# Patient Record
Sex: MALE | Race: WHITE | NOT HISPANIC OR LATINO | Employment: UNEMPLOYED | URBAN - METROPOLITAN AREA
[De-identification: names, ages, dates, MRNs, and addresses within clinical notes are randomized per-mention and may not be internally consistent; named-entity substitution may affect disease eponyms.]

---

## 2020-03-16 ENCOUNTER — OFFICE VISIT (OUTPATIENT)
Dept: UROLOGY | Facility: CLINIC | Age: 80
End: 2020-03-16
Payer: MEDICARE

## 2020-03-16 VITALS
BODY MASS INDEX: 35.36 KG/M2 | SYSTOLIC BLOOD PRESSURE: 111 MMHG | HEART RATE: 81 BPM | DIASTOLIC BLOOD PRESSURE: 62 MMHG | WEIGHT: 220 LBS | HEIGHT: 66 IN | RESPIRATION RATE: 20 BRPM

## 2020-03-16 DIAGNOSIS — N40.1 BPH WITH URINARY OBSTRUCTION: Primary | ICD-10-CM

## 2020-03-16 DIAGNOSIS — N13.8 BPH WITH URINARY OBSTRUCTION: Primary | ICD-10-CM

## 2020-03-16 LAB — POC RESIDUAL URINE VOLUME: 6 ML (ref 0–100)

## 2020-03-16 PROCEDURE — 51798 US URINE CAPACITY MEASURE: CPT | Mod: S$GLB,,, | Performed by: NURSE PRACTITIONER

## 2020-03-16 PROCEDURE — 99214 OFFICE O/P EST MOD 30 MIN: CPT | Mod: 25,S$GLB,, | Performed by: NURSE PRACTITIONER

## 2020-03-16 PROCEDURE — 51798 POCT BLADDER SCAN: ICD-10-PCS | Mod: S$GLB,,, | Performed by: NURSE PRACTITIONER

## 2020-03-16 PROCEDURE — 99214 PR OFFICE/OUTPT VISIT, EST, LEVL IV, 30-39 MIN: ICD-10-PCS | Mod: 25,S$GLB,, | Performed by: NURSE PRACTITIONER

## 2020-03-16 RX ORDER — MONTELUKAST SODIUM 10 MG/1
10 TABLET ORAL DAILY
Status: ON HOLD | COMMUNITY
Start: 2020-02-10 | End: 2023-03-24 | Stop reason: HOSPADM

## 2020-03-16 RX ORDER — FUROSEMIDE 80 MG/1
TABLET ORAL
COMMUNITY
Start: 2020-02-24

## 2020-03-16 RX ORDER — ENALAPRIL MALEATE 20 MG/1
20 TABLET ORAL DAILY
COMMUNITY
Start: 2020-01-07

## 2020-03-16 RX ORDER — ALPRAZOLAM 0.25 MG/1
TABLET ORAL
COMMUNITY
Start: 2020-02-24

## 2020-03-16 RX ORDER — TERAZOSIN 2 MG/1
2 CAPSULE ORAL NIGHTLY
Status: ON HOLD | COMMUNITY
Start: 2019-12-17 | End: 2023-03-24 | Stop reason: HOSPADM

## 2020-03-16 RX ORDER — WARFARIN 4 MG/1
4 TABLET ORAL
Status: ON HOLD | COMMUNITY
Start: 2020-01-07 | End: 2023-03-24 | Stop reason: HOSPADM

## 2020-03-16 RX ORDER — AMLODIPINE BESYLATE 10 MG/1
10 TABLET ORAL DAILY
Status: ON HOLD | COMMUNITY
Start: 2020-01-07 | End: 2023-03-24 | Stop reason: HOSPADM

## 2020-03-16 NOTE — PROGRESS NOTES
Subjective:       Patient ID: Dm Marinelli is a 79 y.o. male.    Chief Complaint: Yrly (PSA (11-18-19): 2.81)      HPI: 79-year-old male, patient of Dr. Simon, presents for yearly re-evaluation.  Patient has a history BPH with obstruction.  He is on Hytrin.  Patient states he is doing well.  Patient denies pain or burning urination.  States he has a good stream.  Denies difficulty voiding.  Denies blood in his urine.  May have occasional nocturia.    Patient's PSA is managed by his primary care doctor.  Patient had a PSA 11/18/2019, which was 2.81.  PCP also manage his Hytrin.    No other urinary complaints.  All other health problems are stable at this time.       Past Medical History:   Past Medical History:   Diagnosis Date    Anxiety     BPH with urinary obstruction     HTN (hypertension)        Past Surgical Historical:   Past Surgical History:   Procedure Laterality Date    PROSTATE BIOPSY          Medications:   Medication List with Changes/Refills   Current Medications    ALPRAZOLAM (XANAX) 0.25 MG TABLET        AMLODIPINE (NORVASC) 10 MG TABLET    Take 10 mg by mouth once daily.    ENALAPRIL (VASOTEC) 20 MG TABLET    Take 20 mg by mouth once daily.    FUROSEMIDE (LASIX) 80 MG TABLET        MONTELUKAST (SINGULAIR) 10 MG TABLET    Take 10 mg by mouth once daily.    TERAZOSIN (HYTRIN) 2 MG CAPSULE    Take 2 mg by mouth every evening.    WARFARIN (COUMADIN) 4 MG TABLET    Take 4 mg by mouth.        Past Social History:   Social History     Socioeconomic History    Marital status:      Spouse name: Not on file    Number of children: Not on file    Years of education: Not on file    Highest education level: Not on file   Occupational History    Not on file   Social Needs    Financial resource strain: Not on file    Food insecurity:     Worry: Not on file     Inability: Not on file    Transportation needs:     Medical: Not on file     Non-medical: Not on file   Tobacco Use    Smoking  status: Never Smoker   Substance and Sexual Activity    Alcohol use: Yes    Drug use: Not on file    Sexual activity: Not on file   Lifestyle    Physical activity:     Days per week: Not on file     Minutes per session: Not on file    Stress: Not on file   Relationships    Social connections:     Talks on phone: Not on file     Gets together: Not on file     Attends [employer] service: Not on file     Active member of club or organization: Not on file     Attends meetings of clubs or organizations: Not on file     Relationship status: Not on file   Other Topics Concern    Not on file   Social History Narrative    Not on file       Allergies: Review of patient's allergies indicates:  No Known Allergies     Family History: History reviewed. No pertinent family history.     Review of Systems:  Review of Systems   Constitutional: Negative for activity change and appetite change.   HENT: Negative for congestion and dental problem.    Eyes: Negative for visual disturbance.   Respiratory: Negative for chest tightness and shortness of breath.    Cardiovascular: Negative for chest pain.   Gastrointestinal: Negative for abdominal distention and abdominal pain.   Genitourinary: Negative for decreased urine volume, difficulty urinating, discharge, dysuria, enuresis, flank pain, frequency, genital sores, hematuria, penile pain, penile swelling, scrotal swelling, testicular pain and urgency.   Musculoskeletal: Negative for back pain and neck pain.   Skin: Negative for color change.   Neurological: Negative for dizziness.   Hematological: Negative for adenopathy.   Psychiatric/Behavioral: Negative for agitation, behavioral problems and confusion.       Physical Exam:  Physical Exam   Nursing note and vitals reviewed.  Constitutional: He is oriented to person, place, and time. He appears well-developed and well-nourished.   HENT:   Head: Normocephalic.   Eyes: Pupils are equal, round, and reactive to light.   Neck: Normal  range of motion. Neck supple.   Cardiovascular: Normal rate, regular rhythm and normal heart sounds.    Pulmonary/Chest: Effort normal and breath sounds normal.   Abdominal: Soft. Bowel sounds are normal.   Genitourinary: Rectum normal, prostate normal and penis normal.   Genitourinary Comments: Prostate exam of is benign, no nodules and nontender.   Musculoskeletal: Normal range of motion.   Neurological: He is alert and oriented to person, place, and time.   Skin: Skin is warm and dry.     Psychiatric: He has a normal mood and affect. His behavior is normal.     Bladder scan:  6 cc    Assessment/Plan:   BPH with obstruction:  Patient will continue Hytrin as directed.  Patient will continue to have his PSA checked by his primary care doctor.    Will plan follow-up in 1 year, sooner if needed.  Problem List Items Addressed This Visit     None      Visit Diagnoses     BPH with urinary obstruction    -  Primary    Relevant Orders    POCT Bladder Scan (Completed)

## 2023-03-23 ENCOUNTER — HOSPITAL ENCOUNTER (INPATIENT)
Facility: HOSPITAL | Age: 83
LOS: 3 days | Discharge: HOSPICE | DRG: 834 | End: 2023-03-26
Attending: INTERNAL MEDICINE | Admitting: INTERNAL MEDICINE
Payer: MEDICARE

## 2023-03-23 DIAGNOSIS — E88.3 TUMOR LYSIS SYNDROME: Primary | ICD-10-CM

## 2023-03-23 DIAGNOSIS — R07.9 CHEST PAIN: ICD-10-CM

## 2023-03-23 DIAGNOSIS — C95.00 ACUTE LEUKEMIA: ICD-10-CM

## 2023-03-23 DIAGNOSIS — D72.829 LEUCOCYTOSIS: ICD-10-CM

## 2023-03-23 DIAGNOSIS — R09.02 HYPOXIA: ICD-10-CM

## 2023-03-23 PROBLEM — R06.02 SHORTNESS OF BREATH: Status: ACTIVE | Noted: 2023-03-23

## 2023-03-23 PROBLEM — N17.9 AKI (ACUTE KIDNEY INJURY): Status: ACTIVE | Noted: 2023-03-23

## 2023-03-23 PROBLEM — F41.9 ANXIETY: Status: ACTIVE | Noted: 2023-03-23

## 2023-03-23 PROBLEM — I10 HTN (HYPERTENSION): Status: ACTIVE | Noted: 2023-03-23

## 2023-03-23 PROBLEM — I48.0 PAROXYSMAL ATRIAL FIBRILLATION: Status: ACTIVE | Noted: 2023-03-23

## 2023-03-23 PROBLEM — R53.81 PHYSICAL DEBILITY: Status: ACTIVE | Noted: 2023-03-23

## 2023-03-23 PROBLEM — D64.9 ANEMIA: Status: ACTIVE | Noted: 2023-03-23

## 2023-03-23 LAB
ABO + RH BLD: NORMAL
ALBUMIN SERPL BCP-MCNC: 3.2 G/DL (ref 3.5–5.2)
ALP SERPL-CCNC: 59 U/L (ref 55–135)
ALT SERPL W/O P-5'-P-CCNC: 15 U/L (ref 10–44)
ANION GAP SERPL CALC-SCNC: 10 MMOL/L (ref 8–16)
ANISOCYTOSIS BLD QL SMEAR: SLIGHT
APTT BLDCRRT: 44.1 SEC (ref 21–32)
AST SERPL-CCNC: 21 U/L (ref 10–40)
BASO STIPL BLD QL SMEAR: ABNORMAL
BASOPHILS NFR BLD: 0 % (ref 0–1.9)
BILIRUB SERPL-MCNC: 0.9 MG/DL (ref 0.1–1)
BLD GP AB SCN CELLS X3 SERPL QL: NORMAL
BNP SERPL-MCNC: 1102 PG/ML (ref 0–99)
BUN SERPL-MCNC: 155 MG/DL (ref 8–23)
CALCIUM SERPL-MCNC: 7.9 MG/DL (ref 8.7–10.5)
CHLORIDE SERPL-SCNC: 107 MMOL/L (ref 95–110)
CO2 SERPL-SCNC: 18 MMOL/L (ref 23–29)
CREAT SERPL-MCNC: 4 MG/DL (ref 0.5–1.4)
DIFFERENTIAL METHOD: ABNORMAL
DOHLE BOD BLD QL SMEAR: PRESENT
EOSINOPHIL NFR BLD: 0 % (ref 0–8)
ERYTHROCYTE [DISTWIDTH] IN BLOOD BY AUTOMATED COUNT: 21.1 % (ref 11.5–14.5)
EST. GFR  (NO RACE VARIABLE): 14.1 ML/MIN/1.73 M^2
FERRITIN SERPL-MCNC: 365 NG/ML (ref 20–300)
FIBRINOGEN PPP-MCNC: 389 MG/DL (ref 182–400)
FOLATE SERPL-MCNC: 7.3 NG/ML (ref 4–24)
GIANT PLATELETS BLD QL SMEAR: PRESENT
GLUCOSE SERPL-MCNC: 121 MG/DL (ref 70–110)
HBV CORE AB SERPL QL IA: NORMAL
HBV SURFACE AB SER-ACNC: <3 MIU/ML
HBV SURFACE AB SER-ACNC: NORMAL M[IU]/ML
HBV SURFACE AG SERPL QL IA: NORMAL
HCT VFR BLD AUTO: 22.9 % (ref 40–54)
HCV AB SERPL QL IA: NORMAL
HGB BLD-MCNC: 7 G/DL (ref 14–18)
HIV 1+2 AB+HIV1 P24 AG SERPL QL IA: NORMAL
HYPOCHROMIA BLD QL SMEAR: ABNORMAL
IMM GRANULOCYTES # BLD AUTO: ABNORMAL K/UL (ref 0–0.04)
IMM GRANULOCYTES NFR BLD AUTO: ABNORMAL % (ref 0–0.5)
INR PPP: 2.8 (ref 0.8–1.2)
LDH SERPL L TO P-CCNC: 477 U/L (ref 110–260)
LYMPHOCYTES NFR BLD: 8 % (ref 18–48)
MAGNESIUM SERPL-MCNC: 2.9 MG/DL (ref 1.6–2.6)
MCH RBC QN AUTO: 30.3 PG (ref 27–31)
MCHC RBC AUTO-ENTMCNC: 30.6 G/DL (ref 32–36)
MCV RBC AUTO: 99 FL (ref 82–98)
METAMYELOCYTES NFR BLD MANUAL: 5 %
MONOCYTES NFR BLD: 0 % (ref 4–15)
MYELOCYTES NFR BLD MANUAL: 4 %
NEUTROPHILS NFR BLD: 73 % (ref 38–73)
NEUTS BAND NFR BLD MANUAL: 4 %
NRBC BLD-RTO: 7 /100 WBC
OVALOCYTES BLD QL SMEAR: ABNORMAL
PHOSPHATE SERPL-MCNC: 6.7 MG/DL (ref 2.7–4.5)
PLATELET # BLD AUTO: 403 K/UL (ref 150–450)
PLATELET BLD QL SMEAR: ABNORMAL
PMV BLD AUTO: 11.4 FL (ref 9.2–12.9)
POIKILOCYTOSIS BLD QL SMEAR: SLIGHT
POLYCHROMASIA BLD QL SMEAR: ABNORMAL
POTASSIUM SERPL-SCNC: 5.7 MMOL/L (ref 3.5–5.1)
PROMYELOCYTES NFR BLD MANUAL: 2 %
PROT SERPL-MCNC: 6.3 G/DL (ref 6–8.4)
PROTHROMBIN TIME: 27.5 SEC (ref 9–12.5)
RBC # BLD AUTO: 2.31 M/UL (ref 4.6–6.2)
SODIUM SERPL-SCNC: 135 MMOL/L (ref 136–145)
SPECIMEN OUTDATE: NORMAL
URATE SERPL-MCNC: 21.8 MG/DL (ref 3.4–7)
VIT B12 SERPL-MCNC: >2000 PG/ML (ref 210–950)
WBC # BLD AUTO: 41.53 K/UL (ref 3.9–12.7)
WBC OTHER NFR BLD MANUAL: 4 %

## 2023-03-23 PROCEDURE — 51702 INSERT TEMP BLADDER CATH: CPT

## 2023-03-23 PROCEDURE — 85027 COMPLETE CBC AUTOMATED: CPT | Performed by: NURSE PRACTITIONER

## 2023-03-23 PROCEDURE — 20600001 HC STEP DOWN PRIVATE ROOM

## 2023-03-23 PROCEDURE — 88189 FLOWCYTOMETRY/READ 16 & >: CPT | Mod: ,,, | Performed by: PATHOLOGY

## 2023-03-23 PROCEDURE — 87340 HEPATITIS B SURFACE AG IA: CPT | Performed by: NURSE PRACTITIONER

## 2023-03-23 PROCEDURE — 85007 BL SMEAR W/DIFF WBC COUNT: CPT | Performed by: NURSE PRACTITIONER

## 2023-03-23 PROCEDURE — 85384 FIBRINOGEN ACTIVITY: CPT | Performed by: NURSE PRACTITIONER

## 2023-03-23 PROCEDURE — 86704 HEP B CORE ANTIBODY TOTAL: CPT | Performed by: NURSE PRACTITIONER

## 2023-03-23 PROCEDURE — 93005 ELECTROCARDIOGRAM TRACING: CPT

## 2023-03-23 PROCEDURE — 85060 BLOOD SMEAR INTERPRETATION: CPT | Mod: ,,, | Performed by: PATHOLOGY

## 2023-03-23 PROCEDURE — 27000221 HC OXYGEN, UP TO 24 HOURS

## 2023-03-23 PROCEDURE — 85610 PROTHROMBIN TIME: CPT | Performed by: NURSE PRACTITIONER

## 2023-03-23 PROCEDURE — 88184 FLOWCYTOMETRY/ TC 1 MARKER: CPT | Performed by: PATHOLOGY

## 2023-03-23 PROCEDURE — 86920 COMPATIBILITY TEST SPIN: CPT | Performed by: NURSE PRACTITIONER

## 2023-03-23 PROCEDURE — 36415 COLL VENOUS BLD VENIPUNCTURE: CPT | Performed by: INTERNAL MEDICINE

## 2023-03-23 PROCEDURE — 85060 PATHOLOGIST REVIEW: ICD-10-PCS | Mod: ,,, | Performed by: PATHOLOGY

## 2023-03-23 PROCEDURE — 99223 1ST HOSP IP/OBS HIGH 75: CPT | Mod: AI,,, | Performed by: INTERNAL MEDICINE

## 2023-03-23 PROCEDURE — 82728 ASSAY OF FERRITIN: CPT | Performed by: NURSE PRACTITIONER

## 2023-03-23 PROCEDURE — 83615 LACTATE (LD) (LDH) ENZYME: CPT | Performed by: NURSE PRACTITIONER

## 2023-03-23 PROCEDURE — 87389 HIV-1 AG W/HIV-1&-2 AB AG IA: CPT | Performed by: NURSE PRACTITIONER

## 2023-03-23 PROCEDURE — 36415 COLL VENOUS BLD VENIPUNCTURE: CPT | Performed by: NURSE PRACTITIONER

## 2023-03-23 PROCEDURE — 82746 ASSAY OF FOLIC ACID SERUM: CPT | Performed by: NURSE PRACTITIONER

## 2023-03-23 PROCEDURE — 86706 HEP B SURFACE ANTIBODY: CPT | Mod: 91 | Performed by: NURSE PRACTITIONER

## 2023-03-23 PROCEDURE — 85730 THROMBOPLASTIN TIME PARTIAL: CPT | Performed by: NURSE PRACTITIONER

## 2023-03-23 PROCEDURE — 25000003 PHARM REV CODE 250: Performed by: STUDENT IN AN ORGANIZED HEALTH CARE EDUCATION/TRAINING PROGRAM

## 2023-03-23 PROCEDURE — 80053 COMPREHEN METABOLIC PANEL: CPT | Performed by: NURSE PRACTITIONER

## 2023-03-23 PROCEDURE — 94761 N-INVAS EAR/PLS OXIMETRY MLT: CPT

## 2023-03-23 PROCEDURE — 93010 EKG 12-LEAD: ICD-10-PCS | Mod: ,,, | Performed by: INTERNAL MEDICINE

## 2023-03-23 PROCEDURE — 83735 ASSAY OF MAGNESIUM: CPT | Performed by: NURSE PRACTITIONER

## 2023-03-23 PROCEDURE — 84100 ASSAY OF PHOSPHORUS: CPT | Performed by: NURSE PRACTITIONER

## 2023-03-23 PROCEDURE — 86900 BLOOD TYPING SEROLOGIC ABO: CPT | Performed by: NURSE PRACTITIONER

## 2023-03-23 PROCEDURE — 86803 HEPATITIS C AB TEST: CPT | Performed by: NURSE PRACTITIONER

## 2023-03-23 PROCEDURE — 83880 ASSAY OF NATRIURETIC PEPTIDE: CPT | Performed by: NURSE PRACTITIONER

## 2023-03-23 PROCEDURE — 99223 PR INITIAL HOSPITAL CARE,LEVL III: ICD-10-PCS | Mod: AI,,, | Performed by: INTERNAL MEDICINE

## 2023-03-23 PROCEDURE — 84550 ASSAY OF BLOOD/URIC ACID: CPT | Performed by: NURSE PRACTITIONER

## 2023-03-23 PROCEDURE — 88185 FLOWCYTOMETRY/TC ADD-ON: CPT | Performed by: PATHOLOGY

## 2023-03-23 PROCEDURE — 63600175 PHARM REV CODE 636 W HCPCS: Performed by: STUDENT IN AN ORGANIZED HEALTH CARE EDUCATION/TRAINING PROGRAM

## 2023-03-23 PROCEDURE — 88189 PR  FLOWCYTOMETRY/READ, 16 & > MARKERS: ICD-10-PCS | Mod: ,,, | Performed by: PATHOLOGY

## 2023-03-23 PROCEDURE — 93010 ELECTROCARDIOGRAM REPORT: CPT | Mod: ,,, | Performed by: INTERNAL MEDICINE

## 2023-03-23 PROCEDURE — 82607 VITAMIN B-12: CPT | Performed by: NURSE PRACTITIONER

## 2023-03-23 RX ORDER — ALLOPURINOL 100 MG/1
300 TABLET ORAL DAILY
Status: DISCONTINUED | OUTPATIENT
Start: 2023-03-23 | End: 2023-03-24

## 2023-03-23 RX ORDER — SODIUM CHLORIDE 9 MG/ML
INJECTION, SOLUTION INTRAVENOUS CONTINUOUS
Status: DISCONTINUED | OUTPATIENT
Start: 2023-03-23 | End: 2023-03-23

## 2023-03-23 RX ORDER — SEVELAMER CARBONATE 800 MG/1
1600 TABLET, FILM COATED ORAL
Status: DISCONTINUED | OUTPATIENT
Start: 2023-03-24 | End: 2023-03-26

## 2023-03-23 RX ORDER — FUROSEMIDE 10 MG/ML
80 INJECTION INTRAMUSCULAR; INTRAVENOUS ONCE
Status: COMPLETED | OUTPATIENT
Start: 2023-03-23 | End: 2023-03-23

## 2023-03-23 RX ORDER — ALPRAZOLAM 0.25 MG/1
0.25 TABLET ORAL 2 TIMES DAILY PRN
Status: DISCONTINUED | OUTPATIENT
Start: 2023-03-23 | End: 2023-03-26 | Stop reason: HOSPADM

## 2023-03-23 RX ORDER — FUROSEMIDE 40 MG/1
80 TABLET ORAL DAILY
Status: DISCONTINUED | OUTPATIENT
Start: 2023-03-24 | End: 2023-03-26 | Stop reason: HOSPADM

## 2023-03-23 RX ORDER — SODIUM CHLORIDE 0.9 % (FLUSH) 0.9 %
10 SYRINGE (ML) INJECTION EVERY 12 HOURS PRN
Status: DISCONTINUED | OUTPATIENT
Start: 2023-03-23 | End: 2023-03-26 | Stop reason: HOSPADM

## 2023-03-23 RX ORDER — SODIUM CHLORIDE 9 MG/ML
INJECTION, SOLUTION INTRAVENOUS CONTINUOUS
Status: DISCONTINUED | OUTPATIENT
Start: 2023-03-23 | End: 2023-03-26 | Stop reason: HOSPADM

## 2023-03-23 RX ORDER — GLUCAGON 1 MG
1 KIT INJECTION
Status: DISCONTINUED | OUTPATIENT
Start: 2023-03-23 | End: 2023-03-26 | Stop reason: HOSPADM

## 2023-03-23 RX ORDER — NALOXONE HCL 0.4 MG/ML
0.02 VIAL (ML) INJECTION
Status: DISCONTINUED | OUTPATIENT
Start: 2023-03-23 | End: 2023-03-26 | Stop reason: HOSPADM

## 2023-03-23 RX ORDER — LISINOPRIL 20 MG/1
40 TABLET ORAL DAILY
Status: DISCONTINUED | OUTPATIENT
Start: 2023-03-24 | End: 2023-03-23

## 2023-03-23 RX ADMIN — FUROSEMIDE 80 MG: 10 INJECTION, SOLUTION INTRAMUSCULAR; INTRAVENOUS at 10:03

## 2023-03-23 RX ADMIN — SODIUM CHLORIDE: 9 INJECTION, SOLUTION INTRAVENOUS at 10:03

## 2023-03-23 RX ADMIN — ALLOPURINOL 300 MG: 100 TABLET ORAL at 10:03

## 2023-03-23 NOTE — PLAN OF CARE
A&Ox4. VVS on 2L NC. Adequate UOP per urinal. Tolerating Renal diet well. ML abd hernia, large. Left upper arm skin tear, bilateral buttocks reddened with skin tears, lower coccyx scabs, gluteal cleft reddened, all with foams placed this shift, present upon admit. Ambulated to hallways with standby assist and walker. Pt remains free of falls/injuries this shift. POC reviewed with patient. Pt currently resting comfortably, bed in low position, call light in reach. WCTM.    Problem: Adult Inpatient Plan of Care  Goal: Plan of Care Review  Outcome: Ongoing, Progressing     Problem: Fall Injury Risk  Goal: Absence of Fall and Fall-Related Injury  Outcome: Ongoing, Progressing

## 2023-03-23 NOTE — H&P
"[facility] - [facility]  Hematology  Bone Marrow Transplant  H&P    Subjective:     Principal Problem: Leukocytosis    HPI: 82 year old male transferred from [facility] ED in [place] for suspected acute leukemia. Patient presented to ED there yesterday with complaints of sob and weakness. Patient reports prior he was ambulating with walker at home without difficulty and is now "too weak to stand". He has a history of afib, on Coumadin and HTN. Denies history of heart failure. History of CoVID in 2022 requiring prolonged hospital stay on ventilator and has PEG placed (now removed) and was discharged to LTAC.    At OSH ED noted to have WBC 48k and hgb 6.7. He was transfused 1 unit of PRBC and given 1.5L NS. Also given IV Rocephin x 1.  Lactic 1.7. Peripheral smear showed no increased blasts, leukoerythroblastosis with circulating nucleated RBC's noted. Flow cytometry recommended. BC done. RIP negative. CXR showed mild interstitial edema. D-dimer and INR elevated (on Coumdain) fibrinogen was wnl. Noted to have LOKI with creatinine of 4.7 and K 5.8. .     Patient denies any recent fevers, no sore throat or difficulty swallowing. Denies nausea, vomiting, constipation or diarrhea. Denies any abnormal bleeding. Has no complaints of pain.      Patient information was obtained from patient and past medical records.     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   3/23/2023    furosemide (LASIX) 80 MG tablet    3/22/2023    warfarin (COUMADIN) 4 MG tablet Take 4 mg by mouth.   3/23/2023    amLODIPine (NORVASC) 10 MG tablet Take 10 mg by mouth once daily.   Unknown    montelukast (SINGULAIR) 10 mg tablet Take 10 mg by mouth once daily.   Unknown    terazosin " (HYTRIN) 2 MG capsule Take 2 mg by mouth every evening.   Unknown       Patient has no known allergies.     Past Medical History:   Diagnosis Date    Anxiety     BPH with urinary obstruction     HTN (hypertension)      Past Surgical History:   Procedure Laterality Date    PROSTATE BIOPSY       Family History    None       Tobacco Use    Smoking status: Never    Smokeless tobacco: Not on file   Substance and Sexual Activity    Alcohol use: Yes    Drug use: Not on file    Sexual activity: Not on file       Review of Systems   Constitutional:  Positive for fatigue. Negative for activity change, appetite change, chills, diaphoresis, fever and unexpected weight change.   HENT:  Negative for congestion, dental problem, mouth sores, nosebleeds, postnasal drip, rhinorrhea, sinus pressure, sore throat and trouble swallowing.    Eyes:  Negative for photophobia and visual disturbance.   Respiratory:  Positive for cough and shortness of breath.    Cardiovascular:  Negative for chest pain, palpitations and leg swelling.   Gastrointestinal:  Negative for abdominal distention, abdominal pain, blood in stool, constipation, diarrhea, nausea and vomiting.   Genitourinary:  Negative for dysuria, frequency, hematuria and urgency.   Musculoskeletal:  Negative for arthralgias, back pain and myalgias.   Skin:  Negative for pallor and rash.   Allergic/Immunologic: Negative for immunocompromised state.   Neurological:  Positive for weakness. Negative for dizziness, syncope, numbness and headaches.   Hematological:  Negative for adenopathy. Does not bruise/bleed easily.   Psychiatric/Behavioral:  Negative for confusion. The patient is not nervous/anxious.    Objective:     Vital Signs (Most Recent):  Temp: 97.8 °F (36.6 °C) (03/23/23 1100)  Pulse: 77 (03/23/23 1100)  Resp: 20 (03/23/23 1100)  BP: (Abnormal) 103/55 (03/23/23 1100)  SpO2: 96 % (03/23/23 1100)   Vital Signs (24h Range):  Temp:  [97.5 °F (36.4 °C)-97.8 °F (36.6 °C)]  97.8 °F (36.6 °C)  Pulse:  [64-77] 77  Resp:  [18-20] 20  SpO2:  [89 %-96 %] 96 %  BP: ()/(30-57) 103/55     Weight: 86.2 kg (190 lb)  Body mass index is 26.5 kg/m².  Body surface area is 2.08 meters squared.    ECOG SCORE           [unfilled]    Lines/Drains/Airways       None                   Physical Exam  Vitals reviewed.   Constitutional:       General: He is not in acute distress.     Appearance: He is well-developed. He is obese.   HENT:      Head: Normocephalic.      Mouth/Throat:      Pharynx: No oropharyngeal exudate.   Eyes:      General: No scleral icterus.     Conjunctiva/sclera: Conjunctivae normal.      Pupils: Pupils are equal, round, and reactive to light.   Neck:      Thyroid: No thyromegaly.   Cardiovascular:      Rate and Rhythm: Normal rate. Rhythm irregular.      Pulses: Normal pulses.      Heart sounds: Normal heart sounds.   Pulmonary:      Effort: Pulmonary effort is normal. No respiratory distress.      Breath sounds: Examination of the right-middle field reveals decreased breath sounds. Examination of the left-middle field reveals decreased breath sounds. Examination of the right-lower field reveals decreased breath sounds. Examination of the left-lower field reveals decreased breath sounds. Decreased breath sounds present.      Comments: O2 at 2L NC  Abdominal:      General: Bowel sounds are normal. There is no distension.      Palpations: Abdomen is soft.      Tenderness: There is no abdominal tenderness.   Musculoskeletal:         General: No tenderness. Normal range of motion.      Cervical back: Normal range of motion and neck supple. Normal range of motion.      Right lower leg: Edema (+1) present.      Left lower leg: Edema (+1) present.   Lymphadenopathy:      Head:      Right side of head: No submandibular, preauricular, posterior auricular or occipital adenopathy.      Left side of head: No submandibular, preauricular, posterior auricular or occipital adenopathy.       Cervical: No cervical adenopathy.   Skin:     General: Skin is warm and dry.      Coloration: Skin is not pale.      Findings: No petechiae or rash.   Neurological:      General: No focal deficit present.      Mental Status: He is alert and oriented to person, place, and time.      Cranial Nerves: No cranial nerve deficit.      Coordination: Coordination normal.   Psychiatric:         Mood and Affect: Mood normal.         Behavior: Behavior normal.         Thought Content: Thought content normal.       Significant Labs:   None    Diagnostic Results:  I have reviewed all pertinent imaging results/findings within the past 24 hours.    Assessment/Plan:     Psychiatric  Anxiety  - continue home PRN Xanax    Pulmonary  Shortness of breath  - infections vs heart failure vs leukostasis  - CXR shows increased perihilar and prominent interstitial opacities which could represent a component of pulmonary vascular congestion.  Left lung base opacity cannot be entirely excluded. PA and lateral recommended and ordered  - EKG pending, hx of afib  -  at OSH, will repeat  - 2 D echo pending  - continue home lasix      Cardiac/Vascular  HTN (hypertension)  - enalapril, lasix, norvasc and hytrin on home medications list  - patient reports he no longer takes norvasc or hytrin  - enalapril and lasix continued     Paroxysmal atrial fibrillation  - history of   - on coumadin (will hold for BM BX)  - rate controlled afib at outside EKG  - repeat EKG  - cardiac monitoring    Renal/  LOKI (acute kidney injury)  - creatinine 4.7 at OSH, prior renal function wnl per records  - repeat CMP now and daily  - urine studies ordered  - renal ultrasound ordered  - uric acid, LDH and phos ordered to evaluate for TLS  - avoid nephrotoxic medication    Oncology  * Leukocytosis  - 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 Flow cytometry recommended  -  afebrile  - BC done at previous hospital, will obtain UA and chest X-ray to r/o infection  - received Rocephin x 1 at OSH ED  - peripheral flow ordered  - will likely need bone marrow evaluation  - repeat CBC with coags and TLS labs  - Hep B, C and HIV ordered  - 2D echo ordered    Anemia  - hgb 6.7 at OSH  - transfused 1 unit PRBC prior to transfer  - repeat CBC now and daily  - obtain B12, folate, ferritin  - denies bleeding  - will likely need bone marrow evaluation     Other  Physical debility  - new weakness, unable to stand per patient  - PT/OT eval        VTE Risk Mitigation (From admission, onward)         Ordered     Reason for No Pharmacological VTE Prophylaxis  Once        Question:  Reasons:  Answer:  Thrombocytopenia    03/23/23 1154     IP VTE HIGH RISK PATIENT  Once         03/23/23 1154     Place sequential compression device  Until discontinued         03/23/23 1154                Disposition: admit to inpatient     Meredith Amin NP  Bone Marrow Transplant  Hematology  [facility]

## 2023-03-23 NOTE — HPI
"82 year old male transferred from [facility] ED in [place] for suspected acute leukemia. Patient presented to ED there yesterday with complaints of sob and weakness. Patient reports prior he was ambulating with walker at home without difficulty and is now "too weak to stand". He has a history of afib, on Coumadin and HTN. Denies history of heart failure. History of CoVID in 2022 requiring prolonged hospital stay on ventilator and has PEG placed (now removed) and was discharged to LTAC.    At OSH ED noted to have WBC 48k and hgb 6.7. He was transfused 1 unit of PRBC and given 1.5L NS. Also given IV Rocephin x 1.  Lactic 1.7. Peripheral smear showed no increased blasts, leukoerythroblastosis with circulating nucleated RBC's noted. Flow cytometry recommended. BC done. RIP negative. CXR showed mild interstitial edema. D-dimer and INR elevated (on Coumdain) fibrinogen was wnl. Noted to have LOKI with creatinine of 4.7 and K 5.8. .     Patient denies any recent fevers, no sore throat or difficulty swallowing. Denies nausea, vomiting, constipation or diarrhea. Denies any abnormal bleeding. Has no complaints of pain.  "

## 2023-03-23 NOTE — ASSESSMENT & PLAN NOTE
- hgb 6.7 at OSH  - transfused 1 unit PRBC prior to transfer  - repeat CBC now and daily  - obtain B12, folate, ferritin  - denies bleeding  - will likely need bone marrow evaluation

## 2023-03-23 NOTE — SUBJECTIVE & OBJECTIVE
Patient information was obtained from patient and past medical records.     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   3/23/2023    furosemide (LASIX) 80 MG tablet    3/22/2023    warfarin (COUMADIN) 4 MG tablet Take 4 mg by mouth.   3/23/2023    amLODIPine (NORVASC) 10 MG tablet Take 10 mg by mouth once daily.   Unknown    montelukast (SINGULAIR) 10 mg tablet Take 10 mg by mouth once daily.   Unknown    terazosin (HYTRIN) 2 MG capsule Take 2 mg by mouth every evening.   Unknown       Patient has no known allergies.     Past Medical History:   Diagnosis Date    Anxiety     BPH with urinary obstruction     HTN (hypertension)      Past Surgical History:   Procedure Laterality Date    PROSTATE BIOPSY       Family History    None       Tobacco Use    Smoking status: Never    Smokeless tobacco: Not on file   Substance and Sexual Activity    Alcohol use: Yes    Drug use: Not on file    Sexual activity: Not on file       Review of Systems   Constitutional:  Positive for fatigue. Negative for activity change, appetite change, chills, diaphoresis, fever and unexpected weight change.   HENT:  Negative for congestion, dental problem, mouth sores, nosebleeds, postnasal drip, rhinorrhea, sinus pressure, sore throat and trouble swallowing.    Eyes:  Negative for photophobia and visual disturbance.   Respiratory:  Positive for cough and shortness of breath.    Cardiovascular:  Negative for chest pain, palpitations and leg swelling.   Gastrointestinal:  Negative for abdominal distention, abdominal pain, blood in stool, constipation, diarrhea, nausea and vomiting.   Genitourinary:  Negative for dysuria, frequency, hematuria and urgency.   Musculoskeletal:  Negative for arthralgias, back pain and  myalgias.   Skin:  Negative for pallor and rash.   Allergic/Immunologic: Negative for immunocompromised state.   Neurological:  Positive for weakness. Negative for dizziness, syncope, numbness and headaches.   Hematological:  Negative for adenopathy. Does not bruise/bleed easily.   Psychiatric/Behavioral:  Negative for confusion. The patient is not nervous/anxious.    Objective:     Vital Signs (Most Recent):  Temp: 97.8 °F (36.6 °C) (03/23/23 1100)  Pulse: 77 (03/23/23 1100)  Resp: 20 (03/23/23 1100)  BP: (Abnormal) 103/55 (03/23/23 1100)  SpO2: 96 % (03/23/23 1100)   Vital Signs (24h Range):  Temp:  [97.5 °F (36.4 °C)-97.8 °F (36.6 °C)] 97.8 °F (36.6 °C)  Pulse:  [64-77] 77  Resp:  [18-20] 20  SpO2:  [89 %-96 %] 96 %  BP: ()/(30-57) 103/55     Weight: 86.2 kg (190 lb)  Body mass index is 26.5 kg/m².  Body surface area is 2.08 meters squared.    ECOG SCORE           [unfilled]    Lines/Drains/Airways       None                   Physical Exam  Vitals reviewed.   Constitutional:       General: He is not in acute distress.     Appearance: He is well-developed. He is obese.   HENT:      Head: Normocephalic.      Mouth/Throat:      Pharynx: No oropharyngeal exudate.   Eyes:      General: No scleral icterus.     Conjunctiva/sclera: Conjunctivae normal.      Pupils: Pupils are equal, round, and reactive to light.   Neck:      Thyroid: No thyromegaly.   Cardiovascular:      Rate and Rhythm: Normal rate. Rhythm irregular.      Pulses: Normal pulses.      Heart sounds: Normal heart sounds.   Pulmonary:      Effort: Pulmonary effort is normal. No respiratory distress.      Breath sounds: Examination of the right-middle field reveals decreased breath sounds. Examination of the left-middle field reveals decreased breath sounds. Examination of the right-lower field reveals decreased breath sounds. Examination of the left-lower field reveals decreased breath sounds. Decreased breath sounds present.      Comments: O2 at 2L  NC  Abdominal:      General: Bowel sounds are normal. There is no distension.      Palpations: Abdomen is soft.      Tenderness: There is no abdominal tenderness.   Musculoskeletal:         General: No tenderness. Normal range of motion.      Cervical back: Normal range of motion and neck supple. Normal range of motion.      Right lower leg: Edema (+1) present.      Left lower leg: Edema (+1) present.   Lymphadenopathy:      Head:      Right side of head: No submandibular, preauricular, posterior auricular or occipital adenopathy.      Left side of head: No submandibular, preauricular, posterior auricular or occipital adenopathy.      Cervical: No cervical adenopathy.   Skin:     General: Skin is warm and dry.      Coloration: Skin is not pale.      Findings: No petechiae or rash.   Neurological:      General: No focal deficit present.      Mental Status: He is alert and oriented to person, place, and time.      Cranial Nerves: No cranial nerve deficit.      Coordination: Coordination normal.   Psychiatric:         Mood and Affect: Mood normal.         Behavior: Behavior normal.         Thought Content: Thought content normal.       Significant Labs:   None    Diagnostic Results:  I have reviewed all pertinent imaging results/findings within the past 24 hours.

## 2023-03-23 NOTE — ASSESSMENT & PLAN NOTE
- enalapril, lasix, norvasc and hytrin on home medications list  - patient reports he no longer takes norvasc or hytrin  - enalapril and lasix continued

## 2023-03-23 NOTE — NURSING TRANSFER
Nursing Transfer Note      3/23/2023     Reason patient is being transferred: higher level of care    Transfer From: [facility] in [facility]    Transfer via stretcher    Transfer with O2    Transported by [facility]    Medicines sent: n/a    Any special needs or follow-up needed: contact physician for direct admit    Chart send with patient: Yes    Notified: unable to reach  on file r/t phone number not in service.    Patient reassessed at: 03/23/23 10:45     Upon arrival to floor: cardiac monitor applied, patient oriented to room, call bell in reach, and bed in lowest position    Skin tear to left arm, foam applied  Skin tears to bilateral buttocks, foam applied  Scabs to lower sacral spine, foam applied  Reddish bruising to right arm  Buttocks reddened  Gluteal cleft reddened  Midline abdominal hernia-large    /55 (77)  HR  77  SPO2 96% 2L NC  Temp  97.8  Temp: 97.8

## 2023-03-23 NOTE — ASSESSMENT & PLAN NOTE
- creatinine 4.7 at OSH, prior renal function wnl per records  - repeat CMP now and daily  - urine studies ordered  - renal ultrasound ordered  - uric acid, LDH and phos ordered to evaluate for TLS  - avoid nephrotoxic medication

## 2023-03-23 NOTE — ASSESSMENT & PLAN NOTE
- history of   - on coumadin (will hold for BM BX)  - rate controlled afib at outside EKG  - repeat EKG  - cardiac monitoring

## 2023-03-23 NOTE — ASSESSMENT & PLAN NOTE
- infections vs heart failure vs leukostasis  - CXR shows increased perihilar and prominent interstitial opacities which could represent a component of pulmonary vascular congestion.  Left lung base opacity cannot be entirely excluded. PA and lateral recommended and ordered  - EKG pending, hx of afib  -  at OSH, will repeat  - 2 D echo pending  - continue home lasix

## 2023-03-24 PROBLEM — E88.3 TUMOR LYSIS SYNDROME: Status: ACTIVE | Noted: 2023-03-24

## 2023-03-24 PROBLEM — E87.8 ELECTROLYTE DISTURBANCE: Status: ACTIVE | Noted: 2023-03-24

## 2023-03-24 PROBLEM — C95.00 ACUTE LEUKEMIA: Status: ACTIVE | Noted: 2023-03-23

## 2023-03-24 LAB
ALBUMIN SERPL BCP-MCNC: 3.2 G/DL (ref 3.5–5.2)
ALP SERPL-CCNC: 54 U/L (ref 55–135)
ALT SERPL W/O P-5'-P-CCNC: 15 U/L (ref 10–44)
ANION GAP SERPL CALC-SCNC: 13 MMOL/L (ref 8–16)
ANION GAP SERPL CALC-SCNC: 14 MMOL/L (ref 8–16)
ANISOCYTOSIS BLD QL SMEAR: SLIGHT
APTT BLDCRRT: 44.7 SEC (ref 21–32)
ASCENDING AORTA: 3.59 CM
AST SERPL-CCNC: 21 U/L (ref 10–40)
AV INDEX (PROSTH): 0.84
AV MEAN GRADIENT: 4 MMHG
AV PEAK GRADIENT: 8 MMHG
AV VALVE AREA: 3.64 CM2
AV VELOCITY RATIO: 0.78
BASO STIPL BLD QL SMEAR: ABNORMAL
BASOPHILS # BLD AUTO: ABNORMAL K/UL (ref 0–0.2)
BASOPHILS NFR BLD: 0 % (ref 0–1.9)
BILIRUB SERPL-MCNC: 0.6 MG/DL (ref 0.1–1)
BLD PROD TYP BPU: NORMAL
BLOOD UNIT EXPIRATION DATE: NORMAL
BLOOD UNIT TYPE CODE: 6200
BLOOD UNIT TYPE: NORMAL
BSA FOR ECHO PROCEDURE: 2.08 M2
BUN SERPL-MCNC: 139 MG/DL (ref 8–23)
BUN SERPL-MCNC: 139 MG/DL (ref 8–23)
BUN SERPL-MCNC: 151 MG/DL (ref 8–23)
BUN SERPL-MCNC: 153 MG/DL (ref 8–23)
CALCIUM SERPL-MCNC: 7.7 MG/DL (ref 8.7–10.5)
CALCIUM SERPL-MCNC: 7.9 MG/DL (ref 8.7–10.5)
CHLORIDE SERPL-SCNC: 105 MMOL/L (ref 95–110)
CHLORIDE SERPL-SCNC: 106 MMOL/L (ref 95–110)
CHLORIDE SERPL-SCNC: 107 MMOL/L (ref 95–110)
CHLORIDE SERPL-SCNC: 107 MMOL/L (ref 95–110)
CO2 SERPL-SCNC: 13 MMOL/L (ref 23–29)
CO2 SERPL-SCNC: 13 MMOL/L (ref 23–29)
CO2 SERPL-SCNC: 14 MMOL/L (ref 23–29)
CO2 SERPL-SCNC: 16 MMOL/L (ref 23–29)
CODING SYSTEM: NORMAL
CREAT SERPL-MCNC: 3.6 MG/DL (ref 0.5–1.4)
CREAT SERPL-MCNC: 3.9 MG/DL (ref 0.5–1.4)
CREAT SERPL-MCNC: 3.9 MG/DL (ref 0.5–1.4)
CREAT SERPL-MCNC: 4 MG/DL (ref 0.5–1.4)
CREAT UR-MCNC: 152 MG/DL (ref 23–375)
CROSSMATCH INTERPRETATION: NORMAL
CV ECHO LV RWT: 0.41 CM
DIFFERENTIAL METHOD: ABNORMAL
DISPENSE STATUS: NORMAL
DOP CALC AO PEAK VEL: 1.44 M/S
DOP CALC AO VTI: 21.45 CM
DOP CALC LVOT AREA: 4.3 CM2
DOP CALC LVOT DIAMETER: 2.35 CM
DOP CALC LVOT PEAK VEL: 1.13 M/S
DOP CALC LVOT STROKE VOLUME: 78.12 CM3
DOP CALCLVOT PEAK VEL VTI: 18.02 CM
ECHO LV POSTERIOR WALL: 1.03 CM (ref 0.6–1.1)
EJECTION FRACTION: 55 %
EOSINOPHIL # BLD AUTO: ABNORMAL K/UL (ref 0–0.5)
EOSINOPHIL NFR BLD: 0 % (ref 0–8)
ERYTHROCYTE [DISTWIDTH] IN BLOOD BY AUTOMATED COUNT: 21 % (ref 11.5–14.5)
EST. GFR  (NO RACE VARIABLE): 14.1 ML/MIN/1.73 M^2
EST. GFR  (NO RACE VARIABLE): 14.5 ML/MIN/1.73 M^2
EST. GFR  (NO RACE VARIABLE): 14.5 ML/MIN/1.73 M^2
EST. GFR  (NO RACE VARIABLE): 16 ML/MIN/1.73 M^2
FIBRINOGEN PPP-MCNC: 373 MG/DL (ref 182–400)
FLOW CYTOMETRY ANTIBODIES ANALYZED - BLOOD: NORMAL
FLOW CYTOMETRY COMMENT - BLOOD: NORMAL
FLOW CYTOMETRY INTERPRETATION - BLOOD: NORMAL
FRACTIONAL SHORTENING: 32 % (ref 28–44)
GIANT PLATELETS BLD QL SMEAR: PRESENT
GLUCOSE SERPL-MCNC: 133 MG/DL (ref 70–110)
GLUCOSE SERPL-MCNC: 133 MG/DL (ref 70–110)
GLUCOSE SERPL-MCNC: 95 MG/DL (ref 70–110)
GLUCOSE SERPL-MCNC: 96 MG/DL (ref 70–110)
HCT VFR BLD AUTO: 23.4 % (ref 40–54)
HGB BLD-MCNC: 6.6 G/DL (ref 14–18)
IMM GRANULOCYTES # BLD AUTO: ABNORMAL K/UL (ref 0–0.04)
IMM GRANULOCYTES NFR BLD AUTO: ABNORMAL % (ref 0–0.5)
INR PPP: 2.4 (ref 0.8–1.2)
INTERVENTRICULAR SEPTUM: 1.13 CM (ref 0.6–1.1)
LA MAJOR: 7.42 CM
LA MINOR: 7.54 CM
LA WIDTH: 4.7 CM
LDH SERPL L TO P-CCNC: 489 U/L (ref 110–260)
LEFT ATRIUM SIZE: 5.66 CM
LEFT ATRIUM VOLUME INDEX MOD: 62.8 ML/M2
LEFT ATRIUM VOLUME INDEX: 82.1 ML/M2
LEFT ATRIUM VOLUME MOD: 129.33 CM3
LEFT ATRIUM VOLUME: 169.12 CM3
LEFT INTERNAL DIMENSION IN SYSTOLE: 3.39 CM (ref 2.1–4)
LEFT VENTRICLE DIASTOLIC VOLUME INDEX: 57.5 ML/M2
LEFT VENTRICLE DIASTOLIC VOLUME: 118.45 ML
LEFT VENTRICLE MASS INDEX: 98 G/M2
LEFT VENTRICLE SYSTOLIC VOLUME INDEX: 22.8 ML/M2
LEFT VENTRICLE SYSTOLIC VOLUME: 46.97 ML
LEFT VENTRICULAR INTERNAL DIMENSION IN DIASTOLE: 5 CM (ref 3.5–6)
LEFT VENTRICULAR MASS: 202 G
LYMPHOCYTES # BLD AUTO: ABNORMAL K/UL (ref 1–4.8)
LYMPHOCYTES NFR BLD: 15 % (ref 18–48)
MAGNESIUM SERPL-MCNC: 2.9 MG/DL (ref 1.6–2.6)
MCH RBC QN AUTO: 29.1 PG (ref 27–31)
MCHC RBC AUTO-ENTMCNC: 28.2 G/DL (ref 32–36)
MCV RBC AUTO: 103 FL (ref 82–98)
METAMYELOCYTES NFR BLD MANUAL: 7 %
MONOCYTES # BLD AUTO: ABNORMAL K/UL (ref 0.3–1)
MONOCYTES NFR BLD: 0 % (ref 4–15)
MV PEAK E VEL: 1.38 M/S
MYELOCYTES NFR BLD MANUAL: 6 %
NEUTROPHILS NFR BLD: 61 % (ref 38–73)
NEUTS BAND NFR BLD MANUAL: 4 %
NRBC BLD-RTO: 6 /100 WBC
NUM UNITS TRANS PACKED RBC: NORMAL
OVALOCYTES BLD QL SMEAR: ABNORMAL
PATH REV BLD -IMP: NORMAL
PHOSPHATE SERPL-MCNC: 6.2 MG/DL (ref 2.7–4.5)
PISA TR MAX VEL: 2.76 M/S
PLATELET # BLD AUTO: 388 K/UL (ref 150–450)
PLATELET BLD QL SMEAR: ABNORMAL
PMV BLD AUTO: 11.8 FL (ref 9.2–12.9)
POCT GLUCOSE: 105 MG/DL (ref 70–110)
POCT GLUCOSE: 109 MG/DL (ref 70–110)
POCT GLUCOSE: 114 MG/DL (ref 70–110)
POCT GLUCOSE: 121 MG/DL (ref 70–110)
POCT GLUCOSE: 122 MG/DL (ref 70–110)
POCT GLUCOSE: 122 MG/DL (ref 70–110)
POCT GLUCOSE: 149 MG/DL (ref 70–110)
POCT GLUCOSE: 76 MG/DL (ref 70–110)
POCT GLUCOSE: 95 MG/DL (ref 70–110)
POIKILOCYTOSIS BLD QL SMEAR: SLIGHT
POLYCHROMASIA BLD QL SMEAR: ABNORMAL
POTASSIUM SERPL-SCNC: 5.2 MMOL/L (ref 3.5–5.1)
POTASSIUM SERPL-SCNC: 5.2 MMOL/L (ref 3.5–5.1)
POTASSIUM SERPL-SCNC: 5.3 MMOL/L (ref 3.5–5.1)
POTASSIUM SERPL-SCNC: 5.8 MMOL/L (ref 3.5–5.1)
PROMYELOCYTES NFR BLD MANUAL: 3 %
PROT SERPL-MCNC: 6.1 G/DL (ref 6–8.4)
PROT UR-MCNC: 78 MG/DL (ref 0–15)
PROT/CREAT UR: 0.51 MG/G{CREAT} (ref 0–0.2)
PROTHROMBIN TIME: 23.5 SEC (ref 9–12.5)
RA MAJOR: 7.4 CM
RA PRESSURE: 15 MMHG
RA WIDTH: 4.22 CM
RBC # BLD AUTO: 2.27 M/UL (ref 4.6–6.2)
RIGHT VENTRICULAR END-DIASTOLIC DIMENSION: 4.3 CM
RV TISSUE DOPPLER FREE WALL SYSTOLIC VELOCITY 1 (APICAL 4 CHAMBER VIEW): 12.45 CM/S
SCHISTOCYTES BLD QL SMEAR: ABNORMAL
SINUS: 3.97 CM
SODIUM SERPL-SCNC: 133 MMOL/L (ref 136–145)
SODIUM SERPL-SCNC: 135 MMOL/L (ref 136–145)
STJ: 3.3 CM
TR MAX PG: 30 MMHG
TRICUSPID ANNULAR PLANE SYSTOLIC EXCURSION: 1.41 CM
TV REST PULMONARY ARTERY PRESSURE: 45 MMHG
URATE SERPL-MCNC: 14.6 MG/DL (ref 3.4–7)
URATE SERPL-MCNC: 16.8 MG/DL (ref 3.4–7)
URATE SERPL-MCNC: 19.9 MG/DL (ref 3.4–7)
WBC # BLD AUTO: 39.09 K/UL (ref 3.9–12.7)
WBC OTHER NFR BLD MANUAL: 4 %

## 2023-03-24 PROCEDURE — 84100 ASSAY OF PHOSPHORUS: CPT | Performed by: NURSE PRACTITIONER

## 2023-03-24 PROCEDURE — 80048 BASIC METABOLIC PNL TOTAL CA: CPT | Mod: 91,XB | Performed by: STUDENT IN AN ORGANIZED HEALTH CARE EDUCATION/TRAINING PROGRAM

## 2023-03-24 PROCEDURE — 94761 N-INVAS EAR/PLS OXIMETRY MLT: CPT

## 2023-03-24 PROCEDURE — 82570 ASSAY OF URINE CREATININE: CPT | Performed by: INTERNAL MEDICINE

## 2023-03-24 PROCEDURE — 36430 TRANSFUSION BLD/BLD COMPNT: CPT

## 2023-03-24 PROCEDURE — 25000003 PHARM REV CODE 250: Performed by: STUDENT IN AN ORGANIZED HEALTH CARE EDUCATION/TRAINING PROGRAM

## 2023-03-24 PROCEDURE — 99223 1ST HOSP IP/OBS HIGH 75: CPT | Mod: GC,,, | Performed by: INTERNAL MEDICINE

## 2023-03-24 PROCEDURE — 20600001 HC STEP DOWN PRIVATE ROOM

## 2023-03-24 PROCEDURE — 63600175 PHARM REV CODE 636 W HCPCS: Performed by: STUDENT IN AN ORGANIZED HEALTH CARE EDUCATION/TRAINING PROGRAM

## 2023-03-24 PROCEDURE — 85027 COMPLETE CBC AUTOMATED: CPT | Performed by: NURSE PRACTITIONER

## 2023-03-24 PROCEDURE — 99900035 HC TECH TIME PER 15 MIN (STAT)

## 2023-03-24 PROCEDURE — 99223 PR INITIAL HOSPITAL CARE,LEVL III: ICD-10-PCS | Mod: GC,,, | Performed by: INTERNAL MEDICINE

## 2023-03-24 PROCEDURE — 85384 FIBRINOGEN ACTIVITY: CPT | Performed by: NURSE PRACTITIONER

## 2023-03-24 PROCEDURE — 99233 PR SUBSEQUENT HOSPITAL CARE,LEVL III: ICD-10-PCS | Mod: ,,, | Performed by: INTERNAL MEDICINE

## 2023-03-24 PROCEDURE — 85007 BL SMEAR W/DIFF WBC COUNT: CPT | Performed by: NURSE PRACTITIONER

## 2023-03-24 PROCEDURE — P9040 RBC LEUKOREDUCED IRRADIATED: HCPCS | Performed by: NURSE PRACTITIONER

## 2023-03-24 PROCEDURE — 36415 COLL VENOUS BLD VENIPUNCTURE: CPT | Performed by: STUDENT IN AN ORGANIZED HEALTH CARE EDUCATION/TRAINING PROGRAM

## 2023-03-24 PROCEDURE — 83615 LACTATE (LD) (LDH) ENZYME: CPT | Performed by: NURSE PRACTITIONER

## 2023-03-24 PROCEDURE — 25000003 PHARM REV CODE 250: Mod: TB,JG | Performed by: NURSE PRACTITIONER

## 2023-03-24 PROCEDURE — 85610 PROTHROMBIN TIME: CPT | Performed by: NURSE PRACTITIONER

## 2023-03-24 PROCEDURE — 80048 BASIC METABOLIC PNL TOTAL CA: CPT | Performed by: STUDENT IN AN ORGANIZED HEALTH CARE EDUCATION/TRAINING PROGRAM

## 2023-03-24 PROCEDURE — 84550 ASSAY OF BLOOD/URIC ACID: CPT | Performed by: NURSE PRACTITIONER

## 2023-03-24 PROCEDURE — 84550 ASSAY OF BLOOD/URIC ACID: CPT | Mod: 91 | Performed by: STUDENT IN AN ORGANIZED HEALTH CARE EDUCATION/TRAINING PROGRAM

## 2023-03-24 PROCEDURE — 85730 THROMBOPLASTIN TIME PARTIAL: CPT | Performed by: NURSE PRACTITIONER

## 2023-03-24 PROCEDURE — 25000003 PHARM REV CODE 250: Performed by: INTERNAL MEDICINE

## 2023-03-24 PROCEDURE — 80053 COMPREHEN METABOLIC PANEL: CPT | Performed by: NURSE PRACTITIONER

## 2023-03-24 PROCEDURE — 99900031 HC PATIENT EDUCATION (STAT)

## 2023-03-24 PROCEDURE — 27000221 HC OXYGEN, UP TO 24 HOURS

## 2023-03-24 PROCEDURE — 36415 COLL VENOUS BLD VENIPUNCTURE: CPT | Performed by: NURSE PRACTITIONER

## 2023-03-24 PROCEDURE — 99233 SBSQ HOSP IP/OBS HIGH 50: CPT | Mod: ,,, | Performed by: INTERNAL MEDICINE

## 2023-03-24 PROCEDURE — 82955 ASSAY OF G6PD ENZYME: CPT | Performed by: STUDENT IN AN ORGANIZED HEALTH CARE EDUCATION/TRAINING PROGRAM

## 2023-03-24 PROCEDURE — 83735 ASSAY OF MAGNESIUM: CPT | Performed by: NURSE PRACTITIONER

## 2023-03-24 RX ORDER — SODIUM BICARBONATE 650 MG/1
650 TABLET ORAL 3 TIMES DAILY
Qty: 90 TABLET | Refills: 11
Start: 2023-03-24 | End: 2024-03-23

## 2023-03-24 RX ORDER — SODIUM BICARBONATE 650 MG/1
650 TABLET ORAL 3 TIMES DAILY
Status: DISCONTINUED | OUTPATIENT
Start: 2023-03-24 | End: 2023-03-26 | Stop reason: HOSPADM

## 2023-03-24 RX ORDER — ASPIRIN 81 MG/1
81 TABLET ORAL DAILY
Status: ON HOLD | COMMUNITY
End: 2023-03-24 | Stop reason: HOSPADM

## 2023-03-24 RX ORDER — HYDROCODONE BITARTRATE AND ACETAMINOPHEN 500; 5 MG/1; MG/1
TABLET ORAL
Status: DISCONTINUED | OUTPATIENT
Start: 2023-03-24 | End: 2023-03-26 | Stop reason: HOSPADM

## 2023-03-24 RX ORDER — ALLOPURINOL 100 MG/1
300 TABLET ORAL 2 TIMES DAILY
Status: DISCONTINUED | OUTPATIENT
Start: 2023-03-24 | End: 2023-03-26 | Stop reason: HOSPADM

## 2023-03-24 RX ORDER — ACETAMINOPHEN 325 MG/1
650 TABLET ORAL EVERY 6 HOURS PRN
Status: DISCONTINUED | OUTPATIENT
Start: 2023-03-24 | End: 2023-03-26 | Stop reason: HOSPADM

## 2023-03-24 RX ORDER — SEVELAMER CARBONATE 800 MG/1
1600 TABLET, FILM COATED ORAL
Qty: 180 TABLET | Refills: 11
Start: 2023-03-24 | End: 2024-03-23

## 2023-03-24 RX ORDER — OXYCODONE HYDROCHLORIDE 5 MG/1
5 TABLET ORAL ONCE
Status: COMPLETED | OUTPATIENT
Start: 2023-03-24 | End: 2023-03-24

## 2023-03-24 RX ORDER — CALCIUM GLUCONATE 20 MG/ML
1 INJECTION, SOLUTION INTRAVENOUS
Status: DISPENSED | OUTPATIENT
Start: 2023-03-24 | End: 2023-03-24

## 2023-03-24 RX ORDER — MUPIROCIN 20 MG/G
OINTMENT TOPICAL 2 TIMES DAILY
Status: DISCONTINUED | OUTPATIENT
Start: 2023-03-24 | End: 2023-03-26 | Stop reason: HOSPADM

## 2023-03-24 RX ORDER — ALLOPURINOL 300 MG/1
300 TABLET ORAL 2 TIMES DAILY
Start: 2023-03-24

## 2023-03-24 RX ORDER — DIPHENHYDRAMINE HCL 25 MG
25 CAPSULE ORAL EVERY 6 HOURS PRN
Status: DISCONTINUED | OUTPATIENT
Start: 2023-03-24 | End: 2023-03-26 | Stop reason: HOSPADM

## 2023-03-24 RX ADMIN — SEVELAMER CARBONATE 1600 MG: 800 TABLET, FILM COATED ORAL at 05:03

## 2023-03-24 RX ADMIN — ALLOPURINOL 300 MG: 100 TABLET ORAL at 08:03

## 2023-03-24 RX ADMIN — SODIUM CHLORIDE: 9 INJECTION, SOLUTION INTRAVENOUS at 08:03

## 2023-03-24 RX ADMIN — SODIUM CHLORIDE: 9 INJECTION, SOLUTION INTRAVENOUS at 04:03

## 2023-03-24 RX ADMIN — SODIUM CHLORIDE 1000 ML: 9 INJECTION, SOLUTION INTRAVENOUS at 07:03

## 2023-03-24 RX ADMIN — MUPIROCIN: 20 OINTMENT TOPICAL at 08:03

## 2023-03-24 RX ADMIN — SODIUM BICARBONATE 650 MG: 650 TABLET ORAL at 03:03

## 2023-03-24 RX ADMIN — SODIUM CHLORIDE: 9 INJECTION, SOLUTION INTRAVENOUS at 10:03

## 2023-03-24 RX ADMIN — SODIUM CHLORIDE 1000 ML: 9 INJECTION, SOLUTION INTRAVENOUS at 06:03

## 2023-03-24 RX ADMIN — SODIUM BICARBONATE 650 MG: 650 TABLET ORAL at 08:03

## 2023-03-24 RX ADMIN — CALCIUM GLUCONATE 1 G: 20 INJECTION, SOLUTION INTRAVENOUS at 04:03

## 2023-03-24 RX ADMIN — SEVELAMER CARBONATE 1600 MG: 800 TABLET, FILM COATED ORAL at 08:03

## 2023-03-24 RX ADMIN — ALPRAZOLAM 0.25 MG: 0.25 TABLET ORAL at 01:03

## 2023-03-24 RX ADMIN — DEXTROSE MONOHYDRATE 500 ML: 100 INJECTION, SOLUTION INTRAVENOUS at 03:03

## 2023-03-24 RX ADMIN — OXYCODONE HYDROCHLORIDE 5 MG: 5 TABLET ORAL at 04:03

## 2023-03-24 RX ADMIN — FUROSEMIDE 80 MG: 40 TABLET ORAL at 08:03

## 2023-03-24 RX ADMIN — INSULIN HUMAN 8.62 UNITS: 100 INJECTION, SOLUTION PARENTERAL at 04:03

## 2023-03-24 NOTE — ASSESSMENT & PLAN NOTE
- creatinine 4.7 at OSH, prior renal function wnl per records  - urine studies ordered  - renal ultrasound with medical renal disease  - uric acid, LDH and phos and K elevated, c/w TLS  - avoid nephrotoxic medication  - nephrology consulted, no current need for dialysis   - na bicarb started, phos binder started, K shifted with D10 and insulin, rasburicase given.

## 2023-03-24 NOTE — SUBJECTIVE & OBJECTIVE
Subjective:     Interval History: hypotensive this am, bolus, IVF given. Remains on O2 at 2 L NC. Has new wet cough and stable sob. TLS noted per labs, Nephrology consulted, K shifted, rasburicase given, phos binder started and Na bicarb. Patient made DNR with plan for home hospice given age, co-morbities and aggressive myeloid neoplasm.     Objective:     Vital Signs (Most Recent):  Temp: 97.5 °F (36.4 °C) (03/24/23 1128)  Pulse: 68 (03/24/23 1429)  Resp: 20 (03/24/23 1128)  BP: (Abnormal) 95/54 (03/24/23 1128)  SpO2: (Abnormal) 94 % (03/24/23 1128)   Vital Signs (24h Range):  Temp:  [96.9 °F (36.1 °C)-98.1 °F (36.7 °C)] 97.5 °F (36.4 °C)  Pulse:  [62-87] 68  Resp:  [16-20] 20  SpO2:  [86 %-96 %] 94 %  BP: ()/(42-54) 95/54     Weight: 86.2 kg (190 lb)  Body mass index is 26.5 kg/m².  Body surface area is 2.08 meters squared.    ECOG SCORE             Intake/Output - Last 3 Shifts       Intake/Output Category 03/22 0700 to 03/23 0659 03/23 0700 to 03/24 0659 03/24 0700 to 03/25 0659    I.V. (mL/kg)  547.6 (6.4)     IV Piggyback  532.8     Total Intake(mL/kg)  1080.4 (12.5)     Net  +1080.4            Urine Occurrence  150 x     Stool Occurrence  0 x 0 x            Physical Exam  Vitals reviewed.   Constitutional:       General: He is not in acute distress.     Appearance: He is well-developed. He is obese. He is ill-appearing.   HENT:      Head: Normocephalic.      Mouth/Throat:      Pharynx: No oropharyngeal exudate.   Eyes:      General: No scleral icterus.     Conjunctiva/sclera: Conjunctivae normal.      Pupils: Pupils are equal, round, and reactive to light.   Neck:      Thyroid: No thyromegaly.   Cardiovascular:      Rate and Rhythm: Normal rate. Rhythm irregular.      Pulses: Normal pulses.      Heart sounds: Normal heart sounds.   Pulmonary:      Effort: Respiratory distress present.      Breath sounds: Examination of the right-middle field reveals decreased breath sounds. Examination of the  left-middle field reveals decreased breath sounds. Examination of the right-lower field reveals decreased breath sounds. Examination of the left-lower field reveals decreased breath sounds. Decreased breath sounds and rales present.      Comments: O2 at 2L NC  Abdominal:      General: Bowel sounds are normal. There is distension.      Palpations: Abdomen is soft.      Tenderness: There is no abdominal tenderness.   Genitourinary:     Comments: Meng in place with dark yellow urine   Musculoskeletal:         General: No tenderness. Normal range of motion.      Cervical back: Normal range of motion and neck supple. Normal range of motion.      Right lower leg: Edema (+1) present.      Left lower leg: Edema (+1) present.   Skin:     General: Skin is warm and dry.      Coloration: Skin is not pale.      Findings: No petechiae or rash.   Neurological:      General: No focal deficit present.      Mental Status: He is alert and oriented to person, place, and time.      Cranial Nerves: No cranial nerve deficit.      Coordination: Coordination normal.   Psychiatric:         Mood and Affect: Mood normal.         Behavior: Behavior normal.         Thought Content: Thought content normal.       Significant Labs:   CBC:   Recent Labs   Lab 03/23/23  1800 03/24/23  0450   WBC 41.53* 39.09*   HGB 7.0* 6.6*   HCT 22.9* 23.4*    388   , CMP:   Recent Labs   Lab 03/23/23  1800 03/24/23  0110 03/24/23  0450 03/24/23  0815   * 135* 133*  133* 133*   K 5.7* 5.8* 5.2*  5.2* 5.3*    106 107  107 105   CO2 18* 16* 13*  13* 14*   * 95 133*  133* 96   * 153* 139*  139* 151*   CREATININE 4.0* 4.0* 3.9*  3.9* 3.6*   CALCIUM 7.9* 7.9* 7.7*  7.7* 7.7*   PROT 6.3  --  6.1  --    ALBUMIN 3.2*  --  3.2*  --    BILITOT 0.9  --  0.6  --    ALKPHOS 59  --  54*  --    AST 21  --  21  --    ALT 15  --  15  --    ANIONGAP 10 13 13  13 14   , Coagulation:   Recent Labs   Lab 03/23/23  1800 03/24/23  0450   INR  2.8* 2.4*   APTT 44.1* 44.7*   , LDH: No results for input(s): LDHCSF, BFSOURCE in the last 48 hours., and Uric Acid   Recent Labs   Lab 03/24/23  0109 03/24/23  0450 03/24/23  0815   URICACID 19.9* 16.8* 14.6*       Diagnostic Results:  I have reviewed all pertinent imaging results/findings within the past 24 hours.

## 2023-03-24 NOTE — CONSULTS
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 Nephrology was consulted for LOKI in the setting of TLS. Patient states that he was initially feeling bad with significant weakness which prompted him to go the ED at [place]. Patient was then found to have WBC 48k and hgb 6.7. He was transfused 1 unit of PRBC and given 1.5L NS. Also given IV Rocephin x 1.  Lactic 1.7. Peripheral smear showed no increased blasts, leukoerythroblastosis with circulating nucleated RBC's noted and it was noted to have LOKI with creatinine of 4.7 and K 5.8. Patient states today he feels short of breath and still weak. He has no issues with urination and no prior CKD diagnosis. Patient was told he had heart failure in the past. Last echo per [facility] last year showed normal EF and no diastolic dysfunction. Patient did have a prolonged hospital course for COVID requiring ventilator support and patient was, at that time, discharged to LTAC.       Past Medical History:   Diagnosis Date    Anxiety     BPH with urinary obstruction     HTN (hypertension)        Past Surgical History:   Procedure Laterality Date    PROSTATE BIOPSY         Review of patient's allergies indicates:  No Known Allergies  Current Facility-Administered Medications   Medication Frequency    0.9%  NaCl infusion (for blood administration) Q24H PRN    0.9%  NaCl infusion Continuous    acetaminophen tablet 650 mg Q6H PRN    allopurinoL tablet 300 mg BID     ALPRAZolam tablet 0.25 mg BID PRN    calcium gluconate 1 g in NS IVPB (premixed) Q1H    dextrose 10% bolus 125 mL 125 mL PRN    dextrose 10% bolus 250 mL 250 mL PRN    dextrose 10% bolus 250 mL 250 mL PRN    diphenhydrAMINE capsule 25 mg Q6H PRN    furosemide tablet 80 mg Daily    glucagon (human recombinant) injection 1 mg PRN    mupirocin 2 % ointment BID    naloxone 0.4 mg/mL injection 0.02 mg PRN    sevelamer carbonate tablet 1,600 mg TID WM    sodium bicarbonate tablet 650 mg TID    sodium chloride 0.9% flush 10 mL Q12H PRN     Family History    None       Tobacco Use    Smoking status: Never    Smokeless tobacco: Not on file   Substance and Sexual Activity    Alcohol use: Yes    Drug use: Not on file    Sexual activity: Not on file     Review of Systems   Constitutional:  Positive for activity change. Negative for chills, diaphoresis and fever.   HENT:  Negative for congestion, dental problem, mouth sores, nosebleeds, postnasal drip, rhinorrhea, sinus pressure, sore throat and trouble swallowing.    Eyes:  Negative for photophobia and visual disturbance.   Respiratory:  Positive for shortness of breath. Negative for cough.    Cardiovascular:  Negative for chest pain, palpitations and leg swelling.   Gastrointestinal:  Negative for abdominal distention, abdominal pain, blood in stool, constipation, diarrhea, nausea and vomiting.   Genitourinary:  Negative for dysuria, frequency, hematuria and urgency.   Musculoskeletal:  Negative for arthralgias, back pain and myalgias.   Skin:  Negative for pallor and rash.   Allergic/Immunologic: Negative for immunocompromised state.   Neurological:  Positive for weakness. Negative for dizziness, syncope, numbness and headaches.   Hematological:  Negative for adenopathy. Does not bruise/bleed easily.   Psychiatric/Behavioral:  Negative for confusion. The patient is not nervous/anxious.    Objective:     Vital Signs (Most Recent):  Temp: 98.1 °F (36.7 °C)  (03/24/23 0610)  Pulse: 70 (03/24/23 0635)  Resp: 16 (03/24/23 0610)  BP: (!) 120/54 (03/24/23 0811)  SpO2: (!) 92 % (03/24/23 0635)   Vital Signs (24h Range):  Temp:  [97.4 °F (36.3 °C)-98.1 °F (36.7 °C)] 98.1 °F (36.7 °C)  Pulse:  [64-82] 70  Resp:  [16-20] 16  SpO2:  [86 %-96 %] 92 %  BP: ()/(42-55) 120/54     Weight: 86.2 kg (190 lb) (03/23/23 1100)  Body mass index is 26.5 kg/m².  Body surface area is 2.08 meters squared.    I/O last 3 completed shifts:  In: 1080.4 [I.V.:547.6; IV Piggyback:532.8]  Out: -     Physical Exam  Vitals and nursing note reviewed.   Constitutional:       General: He is not in acute distress.     Appearance: He is well-developed. He is obese. He is ill-appearing and diaphoretic.   HENT:      Head: Normocephalic.      Mouth/Throat:      Pharynx: No oropharyngeal exudate.   Eyes:      General: No scleral icterus.     Conjunctiva/sclera: Conjunctivae normal.   Neck:      Thyroid: No thyromegaly.   Cardiovascular:      Rate and Rhythm: Normal rate. Rhythm irregular.      Pulses: Normal pulses.      Heart sounds: Normal heart sounds.   Pulmonary:      Effort: Pulmonary effort is normal. No respiratory distress.      Breath sounds: Rales present. No wheezing.   Abdominal:      General: Bowel sounds are normal. There is no distension.      Palpations: Abdomen is soft.      Tenderness: There is no abdominal tenderness.   Musculoskeletal:         General: No tenderness. Normal range of motion.      Cervical back: Normal range of motion and neck supple. Normal range of motion.      Right lower leg: Edema present.      Left lower leg: Edema present.      Comments: Trace edema   Lymphadenopathy:      Head:      Right side of head: No submandibular, preauricular, posterior auricular or occipital adenopathy.      Left side of head: No submandibular, preauricular, posterior auricular or occipital adenopathy.      Cervical: No cervical adenopathy.   Skin:     General: Skin is warm.       Coloration: Skin is not pale.      Findings: No petechiae or rash.   Neurological:      General: No focal deficit present.      Mental Status: He is alert and oriented to person, place, and time.      Cranial Nerves: No cranial nerve deficit.      Coordination: Coordination normal.   Psychiatric:         Mood and Affect: Mood normal.         Behavior: Behavior normal.         Thought Content: Thought content normal.       Significant Labs:  CBC:   Recent Labs   Lab 03/24/23  0450   WBC 39.09*   RBC 2.27*   HGB 6.6*   HCT 23.4*      *   MCH 29.1   MCHC 28.2*     CMP:   Recent Labs   Lab 03/24/23  0450 03/24/23  0815   *  133* 96   CALCIUM 7.7*  7.7* 7.7*   ALBUMIN 3.2*  --    PROT 6.1  --    *  133* 133*   K 5.2*  5.2* 5.3*   CO2 13*  13* 14*     107 105   *  139* 151*   CREATININE 3.9*  3.9* 3.6*   ALKPHOS 54*  --    ALT 15  --    AST 21  --    BILITOT 0.6  --      LFTs:   Recent Labs   Lab 03/24/23  0450   ALT 15   AST 21   ALKPHOS 54*   BILITOT 0.6   PROT 6.1   ALBUMIN 3.2*     All labs within the past 24 hours have been reviewed.    Significant Imaging:  Labs: Reviewed    Assessment/Plan:     Cardiac/Vascular  HTN (hypertension)  See LOKI    Renal/  LOKI (acute kidney injury)  82 yoM admitted for acute leukemia and TLS. Nephrology consulted for LOKI in setting of TLS. Patient had increasing Cr to 3.9 today. No prior history of kidney disease. Patient hase elevated uric acid and, phos, and LDH but stable with medical management.     Etiology of LOKI multifactorial. Patient is hypotensive with MAPs of 61-65 noted overnight. TLS and acute leukemia also can contribute to LOKI.     - agree with fluids and lasix for management of TLS volume overload  - maintain MAPs >65  - avoid nephrotoxic agents (hold home lisinopril) and renally dose medications  - no indication for dialysis currently. (see staff attestation for final recommendations)    Oncology  * Acute  leukemia  Management per primary      Please see staff attestation for final recommendations    Thank you for your consult. I will follow-up with patient. Please contact us if you have any additional questions.    Luis Carlos Levy MD  Nephrology  [facility]

## 2023-03-24 NOTE — PT/OT/SLP PROGRESS
Physical Therapy      Patient Name:  Dm Marinelli   MRN:  [id number]    Patient not seen today secondary to  (Low H&H on first attempt, pt declined on 2nd attempt.). Per chart and RN, pt is to DC home with hospice. PT educated that hospice does not usually provide therapy services, but equipment is provided. Pt would like therapy to f/u tomorrow if able in case he has any questions prior to DC. Will follow-up as appropriate/able.

## 2023-03-24 NOTE — ASSESSMENT & PLAN NOTE
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

## 2023-03-24 NOTE — ASSESSMENT & PLAN NOTE
82 yoM admitted for acute leukemia and TLS. Nephrology consulted for LOKI in setting of TLS. Patient had increasing Cr to 3.9 today. No prior history of kidney disease. Patient hase elevated uric acid and, phos, and LDH but stable with medical management.     Etiology of LOKI multifactorial. Patient is hypotensive with MAPs of 61-65 noted overnight. TLS and acute leukemia also can contribute to LOKI.     - agree with fluids and lasix for management of TLS volume overload  - maintain MAPs >65  - avoid nephrotoxic agents (hold home lisinopril) and renally dose medications  - no indication for dialysis currently. (see staff attestation for final recommendations)

## 2023-03-24 NOTE — ASSESSMENT & PLAN NOTE
- infections vs heart failure vs leukostasis  - CXR shows increased perihilar and prominent interstitial opacities which could represent a component of pulmonary vascular congestion.  Left lung base opacity cannot be entirely excluded. PA and lateral recommended which showed same with left pleural effusion and ordered  - EKG rate controlled afib, hx of afib  -  at OSH, repeat here >1000  - 2 D echo with ef 55% and severe bilateral atrial enlargement   - continue home lasix

## 2023-03-24 NOTE — NURSING
Vitals:    03/24/23 0635   BP: (!) 98/47   Pulse: 70   Resp:    Temp:      Kaur sams MD. Blood pressure previous was 71/40. Turned back on back and readjusted blood pressure cuff and reading was 98/47. Patient's pressures has been running mid to low 90s for night shift.   Rapid team is aware. Will continue plan of care

## 2023-03-24 NOTE — HOSPITAL COURSE
Transferred from OSH pm 3/23 for suspected leukemia, presented to outside ED with sob and weakness. Found to have WBC 48k and hgb 6.7 with LOKI. Received PRBC and IVF at OSH. Upon arrival labs repeated with additional studies, uric acid 21.8, phos elevated at 6.7, k elevated at 5.8 and creatinine 4.0 all consistent with TLS. Rasburicase given and allopurinol started. Hgb 6.6 this am, additional PRBC given. Hyperkalemia shifted with insulin and D10, phos binder started and Na bicarb tabs started this am. BMP q 4, K and creatinine and uric acid gradually improving. Nephrology consulted and following, no need for dialysis at this time. IVF started. Meng placed per Nephro for accurate output. History of afib, EKG showing rate controlled afib, CHF history, continued po lasix, BNP 1000, echo with ejection fraction is 55%, mild right ventricular enlargement with mildly reduced right ventricular systolic function, severe biatrial enlargement. CXR with pulmonary vascular congestion. BP 70/40 this am, bolus given. 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 Plans were for bone marrow biopsy today. Given patients rapid decline overnight, functional status, co morbidities and aggressive hematologic malignancy with poor prognosis recommendation made for hospice care after discussion with Dr. Dawson. Patient in agreement with home hospice. Son notified by phone who is also in agreement with plan. He was discharged home with hospice care on 3/26

## 2023-03-24 NOTE — ASSESSMENT & PLAN NOTE
- history of   - on coumadin (held for possible BM BX)  - rate controlled afib on EKG  - cardiac monitoring

## 2023-03-24 NOTE — ASSESSMENT & PLAN NOTE
- due to TLS  - phos binder started, K shifted with insulin and D10  - improvement in electrolytes noted  - Nephrology following

## 2023-03-24 NOTE — ASSESSMENT & PLAN NOTE
- hgb 6.7 at OSH  - transfused 1 unit PRBC prior to transfer  - CBC this am with hgb 6.6  - B12, folate, ferritin wnl  - denies bleeding  - likely due to myeloid malignancy

## 2023-03-24 NOTE — PLAN OF CARE
End of Shift:    Patient resting in bed with eyes closed. Visualized chest rise and fall.   Prn pain medications given x1. See MAR for details.  Meng placed over night. K 5.8 D10 and Regular 8.6 unit given IV.  AAOx4. 2LNC.     Bed locked and in lowest position with side rails up x2. Call light and bedside table within reach.    Will continue plan of care.    Problem: Fall Injury Risk  Goal: Absence of Fall and Fall-Related Injury  Outcome: Ongoing, Progressing     Problem: Fluid and Electrolyte Imbalance (Acute Kidney Injury/Impairment)  Goal: Fluid and Electrolyte Balance  Outcome: Ongoing, Progressing     Problem: Infection  Goal: Absence of Infection Signs and Symptoms  Outcome: Ongoing, Progressing

## 2023-03-24 NOTE — HPI
This is a 82 year old gentleman with HTN and afib on coumadin with suspected acute leukemia admitted for labs consistent with TLS and hypotension. Nephrology was consulted for LOKI in the setting of TLS. Patient states that he was initially feeling bad with significant weakness which prompted him to go the ED at [place]. Patient was then found to have WBC 48k and hgb 6.7. He was transfused 1 unit of PRBC and given 1.5L NS. Also given IV Rocephin x 1.  Lactic 1.7. Peripheral smear showed no increased blasts, leukoerythroblastosis with circulating nucleated RBC's noted and it was noted to have LOKI with creatinine of 4.7 and K 5.8. Patient states today he feels short of breath and still weak. He has no issues with urination and no prior CKD diagnosis. Patient was told he had heart failure in the past. Last echo per [facility] last year showed normal EF and no diastolic dysfunction. Patient did have a prolonged hospital course for COVID requiring ventilator support and patient was, at that time, discharged to LTAC.

## 2023-03-24 NOTE — AI DETERIORATION ALERT
"RAPID RESPONSE NURSE AI ALERT       AI alert received.    Chart Reviewed: 03/24/2023, 6:29 AM    MRN: [id number]  Bed: 1005/1005 A    Dx: Acute leukemia    Dm Marinelli has a past medical history of Anxiety, BPH with urinary obstruction, and HTN (hypertension).    Last VS: BP (!) 89/54 (BP Location: Right arm, Patient Position: Lying)   Pulse 64   Temp 98.1 °F (36.7 °C) (Oral)   Resp 16   Ht 5' 11" (1.803 m)   Wt 86.2 kg (190 lb)   SpO2 (!) 86%   BMI 26.50 kg/m²     24H Vital Sign Range:  Temp:  [97.4 °F (36.3 °C)-98.1 °F (36.7 °C)]   Pulse:  [64-82]   Resp:  [16-20]   BP: ()/(42-55)   SpO2:  [86 %-96 %]     Level of Consciousness (AVPU): alert    Recent Labs     03/23/23  1800 03/24/23  0450   WBC 41.53* 39.09*   HGB 7.0* 6.6*   HCT 22.9* 23.4*    388       Recent Labs     03/23/23  1800 03/24/23  0110 03/24/23  0450   * 135* 133*  133*   K 5.7* 5.8* 5.2*  5.2*    106 107  107   CO2 18* 16* 13*  13*   CREATININE 4.0* 4.0* 3.9*  3.9*   * 95 133*  133*   PHOS 6.7*  --  6.2*   MG 2.9*  --  2.9*        No results for input(s): PH, PCO2, PO2, HCO3, POCSATURATED, BE in the last 72 hours.     OXYGEN:  Flow (L/min): 2          MEWS score: 2    Bedside RNKamryn  contacted. Pt hypotensive but asymptomatic. Hgb 6.6 on AM CBC, contacting primary team. No concerns verbalized at this time. Instructed to call [phone] for further concerns or assistance.    Aimee Justice RN         "

## 2023-03-24 NOTE — SUBJECTIVE & OBJECTIVE
Past Medical History:   Diagnosis Date    Anxiety     BPH with urinary obstruction     HTN (hypertension)        Past Surgical History:   Procedure Laterality Date    PROSTATE BIOPSY         Review of patient's allergies indicates:  No Known Allergies  Current Facility-Administered Medications   Medication Frequency    0.9%  NaCl infusion (for blood administration) Q24H PRN    0.9%  NaCl infusion Continuous    acetaminophen tablet 650 mg Q6H PRN    allopurinoL tablet 300 mg BID    ALPRAZolam tablet 0.25 mg BID PRN    calcium gluconate 1 g in NS IVPB (premixed) Q1H    dextrose 10% bolus 125 mL 125 mL PRN    dextrose 10% bolus 250 mL 250 mL PRN    dextrose 10% bolus 250 mL 250 mL PRN    diphenhydrAMINE capsule 25 mg Q6H PRN    furosemide tablet 80 mg Daily    glucagon (human recombinant) injection 1 mg PRN    mupirocin 2 % ointment BID    naloxone 0.4 mg/mL injection 0.02 mg PRN    sevelamer carbonate tablet 1,600 mg TID WM    sodium bicarbonate tablet 650 mg TID    sodium chloride 0.9% flush 10 mL Q12H PRN     Family History    None       Tobacco Use    Smoking status: Never    Smokeless tobacco: Not on file   Substance and Sexual Activity    Alcohol use: Yes    Drug use: Not on file    Sexual activity: Not on file     Review of Systems   Constitutional:  Positive for activity change. Negative for chills, diaphoresis and fever.   HENT:  Negative for congestion, dental problem, mouth sores, nosebleeds, postnasal drip, rhinorrhea, sinus pressure, sore throat and trouble swallowing.    Eyes:  Negative for photophobia and visual disturbance.   Respiratory:  Positive for shortness of breath. Negative for cough.    Cardiovascular:  Negative for chest pain, palpitations and leg swelling.   Gastrointestinal:  Negative for abdominal distention, abdominal pain, blood in stool, constipation, diarrhea, nausea and vomiting.   Genitourinary:  Negative for dysuria, frequency, hematuria and urgency.   Musculoskeletal:  Negative for  arthralgias, back pain and myalgias.   Skin:  Negative for pallor and rash.   Allergic/Immunologic: Negative for immunocompromised state.   Neurological:  Positive for weakness. Negative for dizziness, syncope, numbness and headaches.   Hematological:  Negative for adenopathy. Does not bruise/bleed easily.   Psychiatric/Behavioral:  Negative for confusion. The patient is not nervous/anxious.    Objective:     Vital Signs (Most Recent):  Temp: 98.1 °F (36.7 °C) (03/24/23 0610)  Pulse: 70 (03/24/23 0635)  Resp: 16 (03/24/23 0610)  BP: (!) 120/54 (03/24/23 0811)  SpO2: (!) 92 % (03/24/23 0635)   Vital Signs (24h Range):  Temp:  [97.4 °F (36.3 °C)-98.1 °F (36.7 °C)] 98.1 °F (36.7 °C)  Pulse:  [64-82] 70  Resp:  [16-20] 16  SpO2:  [86 %-96 %] 92 %  BP: ()/(42-55) 120/54     Weight: 86.2 kg (190 lb) (03/23/23 1100)  Body mass index is 26.5 kg/m².  Body surface area is 2.08 meters squared.    I/O last 3 completed shifts:  In: 1080.4 [I.V.:547.6; IV Piggyback:532.8]  Out: -     Physical Exam  Vitals and nursing note reviewed.   Constitutional:       General: He is not in acute distress.     Appearance: He is well-developed. He is obese. He is ill-appearing and diaphoretic.   HENT:      Head: Normocephalic.      Mouth/Throat:      Pharynx: No oropharyngeal exudate.   Eyes:      General: No scleral icterus.     Conjunctiva/sclera: Conjunctivae normal.   Neck:      Thyroid: No thyromegaly.   Cardiovascular:      Rate and Rhythm: Normal rate. Rhythm irregular.      Pulses: Normal pulses.      Heart sounds: Normal heart sounds.   Pulmonary:      Effort: Pulmonary effort is normal. No respiratory distress.      Breath sounds: Rales present. No wheezing.   Abdominal:      General: Bowel sounds are normal. There is no distension.      Palpations: Abdomen is soft.      Tenderness: There is no abdominal tenderness.   Musculoskeletal:         General: No tenderness. Normal range of motion.      Cervical back: Normal range of  motion and neck supple. Normal range of motion.      Right lower leg: Edema present.      Left lower leg: Edema present.      Comments: Trace edema   Lymphadenopathy:      Head:      Right side of head: No submandibular, preauricular, posterior auricular or occipital adenopathy.      Left side of head: No submandibular, preauricular, posterior auricular or occipital adenopathy.      Cervical: No cervical adenopathy.   Skin:     General: Skin is warm.      Coloration: Skin is not pale.      Findings: No petechiae or rash.   Neurological:      General: No focal deficit present.      Mental Status: He is alert and oriented to person, place, and time.      Cranial Nerves: No cranial nerve deficit.      Coordination: Coordination normal.   Psychiatric:         Mood and Affect: Mood normal.         Behavior: Behavior normal.         Thought Content: Thought content normal.       Significant Labs:  CBC:   Recent Labs   Lab 03/24/23  0450   WBC 39.09*   RBC 2.27*   HGB 6.6*   HCT 23.4*      *   MCH 29.1   MCHC 28.2*     CMP:   Recent Labs   Lab 03/24/23  0450 03/24/23  0815   *  133* 96   CALCIUM 7.7*  7.7* 7.7*   ALBUMIN 3.2*  --    PROT 6.1  --    *  133* 133*   K 5.2*  5.2* 5.3*   CO2 13*  13* 14*     107 105   *  139* 151*   CREATININE 3.9*  3.9* 3.6*   ALKPHOS 54*  --    ALT 15  --    AST 21  --    BILITOT 0.6  --      LFTs:   Recent Labs   Lab 03/24/23  0450   ALT 15   AST 21   ALKPHOS 54*   BILITOT 0.6   PROT 6.1   ALBUMIN 3.2*     All labs within the past 24 hours have been reviewed.    Significant Imaging:  Labs: Reviewed

## 2023-03-24 NOTE — ASSESSMENT & PLAN NOTE
- enalapril, lasix, norvasc and hytrin on home medications list  - patient reports he no longer takes norvasc or hytrin  - enalapril and lasix continued   - hypotensive this am, enalapril stopped and bolus given

## 2023-03-24 NOTE — PT/OT/SLP PROGRESS
Occupational Therapy      Patient Name:  Dm Marinelli   MRN:  [id number]    OT order for Evaluation & Treat received and session attempted.    Patient not seen today secondary to hold -- patient with low H&H (6.6 & 23.4) and awaiting transfusion in the afternoon. Will follow-up per schedule as patient appropriate to participate with therapy safely.    3/24/2023

## 2023-03-24 NOTE — PROGRESS NOTES
Received request to assist with home hospice.  Met with patient, who expresses no agency preference and confirms home address as [address], [place] [address].  Referral sent via [facility] to [facility] ([phone]).  Spoke to Carmen who was not in intake (all intake staff busy) and unsure if oxygen delivery to home would be possible today.  Awaiting callback from intake; referral under review by Keiry.    Received callback from Vickie at [facility] who met with patient at bedside; he requested that she return tomorrow when son is available to complete full assessment.  Team and RN notified.  Will update following Karime Ch and weekend on-call Karime Bailey on plan.

## 2023-03-25 LAB
ALBUMIN SERPL BCP-MCNC: 3.2 G/DL (ref 3.5–5.2)
ALP SERPL-CCNC: 54 U/L (ref 55–135)
ALT SERPL W/O P-5'-P-CCNC: 15 U/L (ref 10–44)
ANION GAP SERPL CALC-SCNC: 12 MMOL/L (ref 8–16)
ANISOCYTOSIS BLD QL SMEAR: ABNORMAL
APTT PPP: 42.7 SEC (ref 21–32)
AST SERPL-CCNC: 22 U/L (ref 10–40)
BASOPHILS NFR BLD: 0 % (ref 0–1.9)
BILIRUB SERPL-MCNC: 1.2 MG/DL (ref 0.1–1)
BUN SERPL-MCNC: 131 MG/DL (ref 8–23)
BURR CELLS BLD QL SMEAR: ABNORMAL
CALCIUM SERPL-MCNC: 7.9 MG/DL (ref 8.7–10.5)
CHLORIDE SERPL-SCNC: 109 MMOL/L (ref 95–110)
CO2 SERPL-SCNC: 14 MMOL/L (ref 23–29)
CREAT SERPL-MCNC: 3.1 MG/DL (ref 0.5–1.4)
DACRYOCYTES BLD QL SMEAR: ABNORMAL
DIFFERENTIAL METHOD: ABNORMAL
DOHLE BOD BLD QL SMEAR: PRESENT
EOSINOPHIL NFR BLD: 1 % (ref 0–8)
ERYTHROCYTE [DISTWIDTH] IN BLOOD BY AUTOMATED COUNT: 21.8 % (ref 11.5–14.5)
EST. GFR  (NO RACE VARIABLE): 19.1 ML/MIN/1.73 M^2
FIBRINOGEN PPP-MCNC: 389 MG/DL (ref 182–400)
GLUCOSE SERPL-MCNC: 92 MG/DL (ref 70–110)
HCT VFR BLD AUTO: 27.5 % (ref 40–54)
HGB BLD-MCNC: 7.7 G/DL (ref 14–18)
HYPOCHROMIA BLD QL SMEAR: ABNORMAL
IMM GRANULOCYTES # BLD AUTO: ABNORMAL K/UL (ref 0–0.04)
IMM GRANULOCYTES NFR BLD AUTO: ABNORMAL % (ref 0–0.5)
INR PPP: 2 (ref 0.8–1.2)
LDH SERPL L TO P-CCNC: 577 U/L (ref 110–260)
LYMPHOCYTES NFR BLD: 7 % (ref 18–48)
MAGNESIUM SERPL-MCNC: 2.7 MG/DL (ref 1.6–2.6)
MCH RBC QN AUTO: 29.8 PG (ref 27–31)
MCHC RBC AUTO-ENTMCNC: 28 G/DL (ref 32–36)
MCV RBC AUTO: 107 FL (ref 82–98)
METAMYELOCYTES NFR BLD MANUAL: 4 %
MONOCYTES NFR BLD: 1 % (ref 4–15)
MYELOCYTES NFR BLD MANUAL: 2 %
NEUTROPHILS NFR BLD: 71 % (ref 38–73)
NEUTS BAND NFR BLD MANUAL: 8 %
NRBC BLD-RTO: 8 /100 WBC
OVALOCYTES BLD QL SMEAR: ABNORMAL
PHOSPHATE SERPL-MCNC: 5.7 MG/DL (ref 2.7–4.5)
PLATELET # BLD AUTO: 344 K/UL (ref 150–450)
PMV BLD AUTO: 11.9 FL (ref 9.2–12.9)
POIKILOCYTOSIS BLD QL SMEAR: SLIGHT
POLYCHROMASIA BLD QL SMEAR: ABNORMAL
POTASSIUM SERPL-SCNC: 5.2 MMOL/L (ref 3.5–5.1)
PROMYELOCYTES NFR BLD MANUAL: 1 %
PROT SERPL-MCNC: 6.1 G/DL (ref 6–8.4)
PROTHROMBIN TIME: 19.9 SEC (ref 9–12.5)
RBC # BLD AUTO: 2.58 M/UL (ref 4.6–6.2)
SODIUM SERPL-SCNC: 135 MMOL/L (ref 136–145)
SPHEROCYTES BLD QL SMEAR: ABNORMAL
TOXIC GRANULES BLD QL SMEAR: PRESENT
URATE SERPL-MCNC: 7 MG/DL (ref 3.4–7)
WBC # BLD AUTO: 41.89 K/UL (ref 3.9–12.7)
WBC OTHER NFR BLD MANUAL: 5 %

## 2023-03-25 PROCEDURE — 27000221 HC OXYGEN, UP TO 24 HOURS

## 2023-03-25 PROCEDURE — 25000003 PHARM REV CODE 250: Performed by: NURSE PRACTITIONER

## 2023-03-25 PROCEDURE — 36415 COLL VENOUS BLD VENIPUNCTURE: CPT | Performed by: NURSE PRACTITIONER

## 2023-03-25 PROCEDURE — 99233 SBSQ HOSP IP/OBS HIGH 50: CPT | Mod: ,,, | Performed by: INTERNAL MEDICINE

## 2023-03-25 PROCEDURE — 25000003 PHARM REV CODE 250: Performed by: INTERNAL MEDICINE

## 2023-03-25 PROCEDURE — 94761 N-INVAS EAR/PLS OXIMETRY MLT: CPT

## 2023-03-25 PROCEDURE — 84550 ASSAY OF BLOOD/URIC ACID: CPT | Performed by: NURSE PRACTITIONER

## 2023-03-25 PROCEDURE — 99900035 HC TECH TIME PER 15 MIN (STAT)

## 2023-03-25 PROCEDURE — 83615 LACTATE (LD) (LDH) ENZYME: CPT | Performed by: NURSE PRACTITIONER

## 2023-03-25 PROCEDURE — 85610 PROTHROMBIN TIME: CPT | Performed by: NURSE PRACTITIONER

## 2023-03-25 PROCEDURE — 84100 ASSAY OF PHOSPHORUS: CPT | Performed by: NURSE PRACTITIONER

## 2023-03-25 PROCEDURE — 83735 ASSAY OF MAGNESIUM: CPT | Performed by: NURSE PRACTITIONER

## 2023-03-25 PROCEDURE — 99900031 HC PATIENT EDUCATION (STAT)

## 2023-03-25 PROCEDURE — 25000003 PHARM REV CODE 250: Performed by: STUDENT IN AN ORGANIZED HEALTH CARE EDUCATION/TRAINING PROGRAM

## 2023-03-25 PROCEDURE — 85384 FIBRINOGEN ACTIVITY: CPT | Performed by: NURSE PRACTITIONER

## 2023-03-25 PROCEDURE — 20600001 HC STEP DOWN PRIVATE ROOM

## 2023-03-25 PROCEDURE — 85730 THROMBOPLASTIN TIME PARTIAL: CPT | Performed by: NURSE PRACTITIONER

## 2023-03-25 PROCEDURE — 99233 PR SUBSEQUENT HOSPITAL CARE,LEVL III: ICD-10-PCS | Mod: ,,, | Performed by: INTERNAL MEDICINE

## 2023-03-25 PROCEDURE — 85007 BL SMEAR W/DIFF WBC COUNT: CPT | Performed by: NURSE PRACTITIONER

## 2023-03-25 PROCEDURE — 80053 COMPREHEN METABOLIC PANEL: CPT | Performed by: NURSE PRACTITIONER

## 2023-03-25 PROCEDURE — 85027 COMPLETE CBC AUTOMATED: CPT | Performed by: NURSE PRACTITIONER

## 2023-03-25 RX ORDER — HYDROXYUREA 500 MG/1
1000 CAPSULE ORAL 2 TIMES DAILY
Status: DISCONTINUED | OUTPATIENT
Start: 2023-03-25 | End: 2023-03-26 | Stop reason: HOSPADM

## 2023-03-25 RX ORDER — HYDROXYUREA 500 MG/1
1000 CAPSULE ORAL 2 TIMES DAILY
Status: DISCONTINUED | OUTPATIENT
Start: 2023-03-25 | End: 2023-03-25

## 2023-03-25 RX ORDER — HYDROXYUREA 500 MG/1
1000 CAPSULE ORAL 2 TIMES DAILY
Qty: 120 CAPSULE | Refills: 0
Start: 2023-03-25 | End: 2023-04-24

## 2023-03-25 RX ADMIN — SODIUM BICARBONATE 650 MG: 650 TABLET ORAL at 09:03

## 2023-03-25 RX ADMIN — HYDROXYUREA 1000 MG: 500 CAPSULE ORAL at 03:03

## 2023-03-25 RX ADMIN — SODIUM BICARBONATE 650 MG: 650 TABLET ORAL at 03:03

## 2023-03-25 RX ADMIN — SEVELAMER CARBONATE 1600 MG: 800 TABLET, FILM COATED ORAL at 04:03

## 2023-03-25 RX ADMIN — MUPIROCIN: 20 OINTMENT TOPICAL at 09:03

## 2023-03-25 RX ADMIN — SEVELAMER CARBONATE 1600 MG: 800 TABLET, FILM COATED ORAL at 08:03

## 2023-03-25 RX ADMIN — SODIUM CHLORIDE: 9 INJECTION, SOLUTION INTRAVENOUS at 04:03

## 2023-03-25 RX ADMIN — SODIUM BICARBONATE 650 MG: 650 TABLET ORAL at 08:03

## 2023-03-25 RX ADMIN — ALLOPURINOL 300 MG: 100 TABLET ORAL at 08:03

## 2023-03-25 RX ADMIN — SODIUM CHLORIDE: 9 INJECTION, SOLUTION INTRAVENOUS at 06:03

## 2023-03-25 RX ADMIN — SEVELAMER CARBONATE 1600 MG: 800 TABLET, FILM COATED ORAL at 11:03

## 2023-03-25 RX ADMIN — SODIUM CHLORIDE: 9 INJECTION, SOLUTION INTRAVENOUS at 09:03

## 2023-03-25 RX ADMIN — ALLOPURINOL 300 MG: 100 TABLET ORAL at 09:03

## 2023-03-25 RX ADMIN — HYDROXYUREA 1000 MG: 500 CAPSULE ORAL at 09:03

## 2023-03-25 NOTE — PLAN OF CARE
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       Hospice Qualifying Diagnoses:        Patient has a life expectancy < 6 months due to:  Primary Hospice Diagnosis: Acute leukemia with Tumor Lysis Syndrome    Comorbid Conditions Contributing to Decline: CHF, afib, HTN    Vital Signs: Routine per Hospice Protocol.    Code Status: DNR    Allergies: Review of patient's allergies indicates:  No Known Allergies    Diet: Regular    Activities: As tolerated    Goals of Care Treatment Preferences:  Code Status: DNR      Nursing: Per Hospice Routine.      Meng Care: Empty Meng bag Q shift and PRN.  Change Meng every month.    Routine Skin for Bedridden Patients: Apply moisture barrier cream to all skin folds and   wet areas in perineal area daily and after baths and all bowel movements.    Oxygen: 2L NC, titrate PRN       Medication List        START taking these medications      allopurinoL 300 MG tablet  Commonly known as: ZYLOPRIM  Take 1 tablet (300 mg total) by mouth 2 (two) times daily.     hydroxyurea 500 mg Cap  Commonly known as: HYDREA  Take 2 capsules (1,000 mg total) by mouth 2 (two) times daily.     sevelamer carbonate 800 mg Tab  Commonly known as: RENVELA  Take 2 tablets (1,600 mg total) by mouth 3 (three) times daily with meals.     sodium bicarbonate  650 MG tablet  Take 1 tablet (650 mg total) by mouth 3 (three) times daily.            CONTINUE taking these medications      ALPRAZolam 0.25 MG tablet  Commonly known as: XANAX     enalapril 20 MG tablet  Commonly known as: VASOTEC  Take 20 mg by mouth once daily.     furosemide 80 MG tablet  Commonly known as: LASIX            STOP taking these medications      amLODIPine 10 MG tablet  Commonly known as: NORVASC     aspirin 81 MG EC tablet  Commonly known as: ECOTRIN     montelukast 10 mg tablet  Commonly known as: SINGULAIR     terazosin 2 MG capsule  Commonly known as: HYTRIN     warfarin 4 MG tablet  Commonly known as: COUMADIN              Future Orders:  Hospice Medical Director may dictate new orders for comfortable care measures & sign death certificate.        _________________________________  Daily Montez DO  03/25/2023

## 2023-03-25 NOTE — SUBJECTIVE & OBJECTIVE
Subjective:     Interval History: No complaints this morning. Waiting for hospice to be arranged    Objective:     Vital Signs (Most Recent):  Temp: 97.8 °F (36.6 °C) (03/25/23 1552)  Pulse: 78 (03/25/23 1552)  Resp: 20 (03/25/23 1552)  BP: (!) 102/59 (03/25/23 1552)  SpO2: 95 % (03/25/23 1552)   Vital Signs (24h Range):  Temp:  [96.2 °F (35.7 °C)-98.3 °F (36.8 °C)] 97.8 °F (36.6 °C)  Pulse:  [72-83] 78  Resp:  [17-20] 20  SpO2:  [91 %-95 %] 95 %  BP: ()/(50-59) 102/59     Weight: 86.2 kg (190 lb)  Body mass index is 26.5 kg/m².  Body surface area is 2.08 meters squared.    ECOG SCORE             Intake/Output - Last 3 Shifts         03/23 0700  03/24 0659 03/24 0700  03/25 0659 03/25 0700  03/26 0659    P.O.  360     I.V. (mL/kg) 547.6 (6.4) 1780.8 (20.7) 1020.7 (11.8)    Blood  459.2     IV Piggyback 532.8      Total Intake(mL/kg) 1080.4 (12.5) 2599.9 (30.2) 1020.7 (11.8)    Urine (mL/kg/hr)  1200 (0.6) 900 (0.9)    Stool   0    Total Output  1200 900    Net +1080.4 +1399.9 +120.7           Urine Occurrence 150 x      Stool Occurrence 0 x 0 x 0 x            Physical Exam  Vitals reviewed.   Constitutional:       General: He is not in acute distress.     Appearance: He is well-developed. He is obese. He is ill-appearing.   HENT:      Head: Normocephalic.      Mouth/Throat:      Pharynx: No oropharyngeal exudate.   Eyes:      General: No scleral icterus.     Conjunctiva/sclera: Conjunctivae normal.      Pupils: Pupils are equal, round, and reactive to light.   Neck:      Thyroid: No thyromegaly.   Cardiovascular:      Rate and Rhythm: Normal rate. Rhythm irregular.      Pulses: Normal pulses.      Heart sounds: Normal heart sounds.   Pulmonary:      Effort: Respiratory distress present.      Breath sounds: Examination of the right-middle field reveals decreased breath sounds. Examination of the left-middle field reveals decreased breath sounds. Examination of the right-lower field reveals decreased breath  sounds. Examination of the left-lower field reveals decreased breath sounds. Decreased breath sounds and rales present.      Comments: O2 at 2L NC  Abdominal:      General: Bowel sounds are normal. There is distension.      Palpations: Abdomen is soft.      Tenderness: There is no abdominal tenderness.   Genitourinary:     Comments: Meng in place with dark yellow urine   Musculoskeletal:         General: No tenderness. Normal range of motion.      Cervical back: Normal range of motion and neck supple. Normal range of motion.      Right lower leg: Edema (+1) present.      Left lower leg: Edema (+1) present.   Skin:     General: Skin is warm and dry.      Coloration: Skin is not pale.      Findings: No petechiae or rash.   Neurological:      General: No focal deficit present.      Mental Status: He is alert and oriented to person, place, and time.      Cranial Nerves: No cranial nerve deficit.      Coordination: Coordination normal.   Psychiatric:         Mood and Affect: Mood normal.         Behavior: Behavior normal.         Thought Content: Thought content normal.       Significant Labs:   CBC:   Recent Labs   Lab 03/24/23 0450 03/25/23  0737   WBC 39.09* 41.89*   HGB 6.6* 7.7*   HCT 23.4* 27.5*    344     , CMP:   Recent Labs   Lab 03/24/23 0450 03/24/23  0815 03/25/23  0737   *  133* 133* 135*   K 5.2*  5.2* 5.3* 5.2*     107 105 109   CO2 13*  13* 14* 14*   *  133* 96 92   *  139* 151* 131*   CREATININE 3.9*  3.9* 3.6* 3.1*   CALCIUM 7.7*  7.7* 7.7* 7.9*   PROT 6.1  --  6.1   ALBUMIN 3.2*  --  3.2*   BILITOT 0.6  --  1.2*   ALKPHOS 54*  --  54*   AST 21  --  22   ALT 15  --  15   ANIONGAP 13  13 14 12     , Coagulation:   Recent Labs   Lab 03/24/23 0450 03/25/23  0737   INR 2.4* 2.0*   APTT 44.7* 42.7*     , LDH: No results for input(s): LDHCSF, BFSOURCE in the last 48 hours., and Uric Acid   Recent Labs   Lab 03/24/23  0450 03/24/23  0815 03/25/23  0737   URICACID  16.8* 14.6* 7.0         Diagnostic Results:  I have reviewed all pertinent imaging results/findings within the past 24 hours.

## 2023-03-25 NOTE — PLAN OF CARE
Problem: Adult Inpatient Plan of Care  Goal: Plan of Care Review  Outcome: Ongoing, Progressing  Goal: Patient-Specific Goal (Individualized)  Outcome: Ongoing, Progressing  Goal: Absence of Hospital-Acquired Illness or Injury  Outcome: Ongoing, Progressing  Goal: Optimal Comfort and Wellbeing  Outcome: Ongoing, Progressing  Goal: Readiness for Transition of Care  Outcome: Ongoing, Progressing     Problem: Fall Injury Risk  Goal: Absence of Fall and Fall-Related Injury  Outcome: Ongoing, Progressing     Problem: Fluid and Electrolyte Imbalance (Acute Kidney Injury/Impairment)  Goal: Fluid and Electrolyte Balance  Outcome: Ongoing, Progressing     Problem: Oral Intake Inadequate (Acute Kidney Injury/Impairment)  Goal: Optimal Nutrition Intake  Outcome: Ongoing, Progressing     Problem: Renal Function Impairment (Acute Kidney Injury/Impairment)  Goal: Effective Renal Function  Outcome: Ongoing, Progressing     Problem: Impaired Wound Healing  Goal: Optimal Wound Healing  Outcome: Ongoing, Progressing     Problem: Infection  Goal: Absence of Infection Signs and Symptoms  Outcome: Ongoing, Progressing     Patient remained free from falls and injury throughout shift. No acute events overnight. Patient alert and oriented x4; vital signs stable. Safety measures addressed --bed locked and in low position with siderails up x2 and call light/personal items within reach. Plan of care reviewed with patient; all questions and concerns addressed. Patient verbalizes understanding.

## 2023-03-25 NOTE — CARE UPDATE
Per primary team, pt to be discharged with hospice services.     Thank you for allowing us to participate in the care of Dm Marinelli , our nephrology team will sign off, however, please contact us if you have questions or concerns.       Bri Suarez M.D.   Nephrology  [facility]

## 2023-03-25 NOTE — PLAN OF CARE
Pt AAOx4, VSS on 2L O2 via NC. Denies pain. MIVF infusing.  Meng intact, adequate UOP.  Renal diet, tolerating well. Expected discharge tomorrow to home hospice. Call bell within reach. Frequent rounds for safety.    Problem: Adult Inpatient Plan of Care  Goal: Plan of Care Review  Outcome: Ongoing, Progressing  Goal: Patient-Specific Goal (Individualized)  Outcome: Ongoing, Progressing  Goal: Absence of Hospital-Acquired Illness or Injury  Outcome: Ongoing, Progressing  Goal: Optimal Comfort and Wellbeing  Outcome: Ongoing, Progressing  Goal: Readiness for Transition of Care  Outcome: Ongoing, Progressing     Problem: Fall Injury Risk  Goal: Absence of Fall and Fall-Related Injury  Outcome: Ongoing, Progressing     Problem: Fluid and Electrolyte Imbalance (Acute Kidney Injury/Impairment)  Goal: Fluid and Electrolyte Balance  Outcome: Ongoing, Progressing     Problem: Impaired Wound Healing  Goal: Optimal Wound Healing  Outcome: Ongoing, Progressing     Problem: Infection  Goal: Absence of Infection Signs and Symptoms  Outcome: Ongoing, Progressing

## 2023-03-25 NOTE — PROGRESS NOTES
Grace Hou RN notified SW that patient can have son come to hospital or hospice can reach out directly to son. SW contacted [facility], will receive a call back.    SW received call from Madhu at [facility]. He will reach out to patients son, if unable to reach will contact patient or go through the hospital if patient doesn't answer and will eventually call SW back.    Preethi Rivera MD/ with [facility] met with patient and Grayson. Paperwork signed. Preethi notified  that [facility] will head home this evening and accept DME early tomorrow AM. Once DME delivery is confirmed SW will arrange transportation. Grace spoke to [facility] and confirmed. Dr. Montez notified.

## 2023-03-25 NOTE — PROGRESS NOTES
[facility]  Hematology  Bone Marrow Transplant  Progress Note    Patient Name: Dm Marinelli  Admission Date: 3/23/2023  Hospital Length of Stay: 2 days  Code Status: DNR    Subjective:     Interval History: No complaints this morning. Waiting for hospice to be arranged    Objective:     Vital Signs (Most Recent):  Temp: 97.8 °F (36.6 °C) (03/25/23 1552)  Pulse: 78 (03/25/23 1552)  Resp: 20 (03/25/23 1552)  BP: (!) 102/59 (03/25/23 1552)  SpO2: 95 % (03/25/23 1552)   Vital Signs (24h Range):  Temp:  [96.2 °F (35.7 °C)-98.3 °F (36.8 °C)] 97.8 °F (36.6 °C)  Pulse:  [72-83] 78  Resp:  [17-20] 20  SpO2:  [91 %-95 %] 95 %  BP: ()/(50-59) 102/59     Weight: 86.2 kg (190 lb)  Body mass index is 26.5 kg/m².  Body surface area is 2.08 meters squared.    ECOG SCORE             Intake/Output - Last 3 Shifts         03/23 0700  03/24 0659 03/24 0700  03/25 0659 03/25 0700  03/26 0659    P.O.  360     I.V. (mL/kg) 547.6 (6.4) 1780.8 (20.7) 1020.7 (11.8)    Blood  459.2     IV Piggyback 532.8      Total Intake(mL/kg) 1080.4 (12.5) 2599.9 (30.2) 1020.7 (11.8)    Urine (mL/kg/hr)  1200 (0.6) 900 (0.9)    Stool   0    Total Output  1200 900    Net +1080.4 +1399.9 +120.7           Urine Occurrence 150 x      Stool Occurrence 0 x 0 x 0 x            Physical Exam  Vitals reviewed.   Constitutional:       General: He is not in acute distress.     Appearance: He is well-developed. He is obese. He is ill-appearing.   HENT:      Head: Normocephalic.      Mouth/Throat:      Pharynx: No oropharyngeal exudate.   Eyes:      General: No scleral icterus.     Conjunctiva/sclera: Conjunctivae normal.      Pupils: Pupils are equal, round, and reactive to light.   Neck:      Thyroid: No thyromegaly.   Cardiovascular:      Rate and Rhythm: Normal rate. Rhythm irregular.      Pulses: Normal pulses.      Heart sounds: Normal heart sounds.   Pulmonary:      Effort: Respiratory distress present.      Breath sounds: Examination of the  right-middle field reveals decreased breath sounds. Examination of the left-middle field reveals decreased breath sounds. Examination of the right-lower field reveals decreased breath sounds. Examination of the left-lower field reveals decreased breath sounds. Decreased breath sounds and rales present.      Comments: O2 at 2L NC  Abdominal:      General: Bowel sounds are normal. There is distension.      Palpations: Abdomen is soft.      Tenderness: There is no abdominal tenderness.   Genitourinary:     Comments: Meng in place with dark yellow urine   Musculoskeletal:         General: No tenderness. Normal range of motion.      Cervical back: Normal range of motion and neck supple. Normal range of motion.      Right lower leg: Edema (+1) present.      Left lower leg: Edema (+1) present.   Skin:     General: Skin is warm and dry.      Coloration: Skin is not pale.      Findings: No petechiae or rash.   Neurological:      General: No focal deficit present.      Mental Status: He is alert and oriented to person, place, and time.      Cranial Nerves: No cranial nerve deficit.      Coordination: Coordination normal.   Psychiatric:         Mood and Affect: Mood normal.         Behavior: Behavior normal.         Thought Content: Thought content normal.       Significant Labs:   CBC:   Recent Labs   Lab 03/24/23  0450 03/25/23  0737   WBC 39.09* 41.89*   HGB 6.6* 7.7*   HCT 23.4* 27.5*    344     , CMP:   Recent Labs   Lab 03/24/23 0450 03/24/23  0815 03/25/23  0737   *  133* 133* 135*   K 5.2*  5.2* 5.3* 5.2*     107 105 109   CO2 13*  13* 14* 14*   *  133* 96 92   *  139* 151* 131*   CREATININE 3.9*  3.9* 3.6* 3.1*   CALCIUM 7.7*  7.7* 7.7* 7.9*   PROT 6.1  --  6.1   ALBUMIN 3.2*  --  3.2*   BILITOT 0.6  --  1.2*   ALKPHOS 54*  --  54*   AST 21  --  22   ALT 15  --  15   ANIONGAP 13  13 14 12     , Coagulation:   Recent Labs   Lab 03/24/23  0450 03/25/23  0737   INR 2.4* 2.0*    APTT 44.7* 42.7*     , LDH: No results for input(s): LDHCSF, BFSOURCE in the last 48 hours., and Uric Acid   Recent Labs   Lab 03/24/23  0450 03/24/23  0815 03/25/23  0737   URICACID 16.8* 14.6* 7.0         Diagnostic Results:  I have reviewed all pertinent imaging results/findings within the past 24 hours.    Assessment/Plan:     * Acute leukemia  - suspected acute leukemia  - WBC 48k at OSH  - afebrile  - BC done at previous hospital, UA negative and chest X-ray with no evidence of infection  - received Rocephin x 1 at OSH ED  - smear at OSH shows no blasts, leukoerythroblastosis with circulating nucleated RBC's noted. 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     Tumor lysis syndrome  - see [facility], electrolyte disturbance and acute leukemia    Electrolyte disturbance  - due to TLS    Physical debility  - new weakness, unable to stand per patient  - PT/OT eval  - hospice    Anxiety  - continue home PRN Xanax    LOKI (acute kidney injury)  - due to leukemia/tls    HTN (hypertension)  - enalapril, lasix, norvasc and hytrin on home medications list  - holding all for hypotension    Shortness of breath  - infections vs heart failure vs leukostasis  - continue home lasix      Paroxysmal atrial fibrillation  - history of   - on coumadin (held for possible BM BX)    Anemia  - hgb 6.7 at OSH  - transfused 1 unit PRBC prior to transfer    VTE Risk Mitigation (From admission, onward)         Ordered     Reason for No Pharmacological VTE Prophylaxis  Once        Question:  Reasons:   Answer:  Thrombocytopenia    03/23/23 1154     IP VTE HIGH RISK PATIENT  Once         03/23/23 1154     Place sequential compression device  Until discontinued         03/23/23 1154                Disposition: awaiting home hospice    Daily Montez, DO  Bone Marrow Transplant  [facility]

## 2023-03-26 VITALS
TEMPERATURE: 98 F | DIASTOLIC BLOOD PRESSURE: 59 MMHG | BODY MASS INDEX: 26.6 KG/M2 | OXYGEN SATURATION: 94 % | HEART RATE: 80 BPM | SYSTOLIC BLOOD PRESSURE: 111 MMHG | WEIGHT: 190 LBS | RESPIRATION RATE: 20 BRPM | HEIGHT: 71 IN

## 2023-03-26 PROCEDURE — 25000003 PHARM REV CODE 250: Performed by: STUDENT IN AN ORGANIZED HEALTH CARE EDUCATION/TRAINING PROGRAM

## 2023-03-26 PROCEDURE — 25000003 PHARM REV CODE 250: Performed by: NURSE PRACTITIONER

## 2023-03-26 PROCEDURE — 25000003 PHARM REV CODE 250: Performed by: INTERNAL MEDICINE

## 2023-03-26 PROCEDURE — 99239 HOSP IP/OBS DSCHRG MGMT >30: CPT | Mod: ,,, | Performed by: INTERNAL MEDICINE

## 2023-03-26 PROCEDURE — 99239 PR HOSPITAL DISCHARGE DAY,>30 MIN: ICD-10-PCS | Mod: ,,, | Performed by: INTERNAL MEDICINE

## 2023-03-26 RX ORDER — SEVELAMER CARBONATE FOR ORAL SUSPENSION 800 MG/1
0.8 POWDER, FOR SUSPENSION ORAL
Status: DISCONTINUED | OUTPATIENT
Start: 2023-03-26 | End: 2023-03-26 | Stop reason: HOSPADM

## 2023-03-26 RX ORDER — SEVELAMER CARBONATE FOR ORAL SUSPENSION 800 MG/1
0.8 POWDER, FOR SUSPENSION ORAL
Status: DISCONTINUED | OUTPATIENT
Start: 2023-03-26 | End: 2023-03-26

## 2023-03-26 RX ADMIN — FUROSEMIDE 80 MG: 40 TABLET ORAL at 09:03

## 2023-03-26 RX ADMIN — MUPIROCIN: 20 OINTMENT TOPICAL at 09:03

## 2023-03-26 RX ADMIN — HYDROXYUREA 1000 MG: 500 CAPSULE ORAL at 09:03

## 2023-03-26 RX ADMIN — SEVELAMER CARBONATE 0.8 G: 0.8 POWDER, FOR SUSPENSION ORAL at 11:03

## 2023-03-26 RX ADMIN — SODIUM CHLORIDE: 9 INJECTION, SOLUTION INTRAVENOUS at 07:03

## 2023-03-26 RX ADMIN — ACETAMINOPHEN 650 MG: 325 TABLET ORAL at 11:03

## 2023-03-26 RX ADMIN — ALLOPURINOL 300 MG: 100 TABLET ORAL at 09:03

## 2023-03-26 RX ADMIN — SODIUM BICARBONATE 650 MG: 650 TABLET ORAL at 09:03

## 2023-03-26 NOTE — DISCHARGE SUMMARY
"[facility]  Hematology  Bone Marrow Transplant  Discharge Summary      Patient Name: Dm Marinelli  MRN: [id number]  Admission Date: 3/23/2023  Hospital Length of Stay: 3 days  Discharge Date and Time:  03/26/2023 1:37 PM  Attending Physician: Ashley Dawson MD   Discharging Provider: Daily Montez DO  Primary Care Provider: Primary Doctor No    HPI: 82 year old male transferred from [facility] ED in [place] for suspected acute leukemia. Patient presented to ED there yesterday with complaints of sob and weakness. Patient reports prior he was ambulating with walker at home without difficulty and is now "too weak to stand". He has a history of afib, on Coumadin and HTN. Denies history of heart failure. History of CoVID in 2022 requiring prolonged hospital stay on ventilator and has PEG placed (now removed) and was discharged to LTAC.    At OSH ED noted to have WBC 48k and hgb 6.7. He was transfused 1 unit of PRBC and given 1.5L NS. Also given IV Rocephin x 1.  Lactic 1.7. Peripheral smear showed no increased blasts, leukoerythroblastosis with circulating nucleated RBC's noted. Flow cytometry recommended. BC done. RIP negative. CXR showed mild interstitial edema. D-dimer and INR elevated (on Coumdain) fibrinogen was wnl. Noted to have LOKI with creatinine of 4.7 and K 5.8. .     Patient denies any recent fevers, no sore throat or difficulty swallowing. Denies nausea, vomiting, constipation or diarrhea. Denies any abnormal bleeding. Has no complaints of pain.      * No surgery found *     Hospital Course: Transferred from OSH pm 3/23 for suspected leukemia, presented to outside ED with sob and weakness. Found to have WBC 48k and hgb 6.7 with LOKI. Received PRBC and IVF at OSH. Upon arrival labs repeated with additional studies, uric acid 21.8, phos elevated at 6.7, k elevated at 5.8 and creatinine 4.0 all consistent with TLS. Rasburicase given and allopurinol started. Hgb 6.6 this am, " additional PRBC given. Hyperkalemia shifted with insulin and D10, phos binder started and Na bicarb tabs started this am. BMP q 4, K and creatinine and uric acid gradually improving. Nephrology consulted and following, no need for dialysis at this time. IVF started. Meng placed per Nephro for accurate output. History of afib, EKG showing rate controlled afib, CHF history, continued po lasix, BNP 1000, echo with ejection fraction is 55%, mild right ventricular enlargement with mildly reduced right ventricular systolic function, severe biatrial enlargement. CXR with pulmonary vascular congestion. BP 70/40 this am, bolus given. 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 Plans were for bone marrow biopsy today. Given patients rapid decline overnight, functional status, co morbidities and aggressive hematologic malignancy with poor prognosis recommendation made for hospice care after discussion with Dr. Dawson. Patient in agreement with home hospice. Son notified by phone who is also in agreement with plan. He was discharged home with hospice care on 3/26      Goals of Care Treatment Preferences:  Code Status: DNR      Consults (From admission, onward)        Status Ordering Provider     Inpatient consult to Nephrology  Once        Provider:  (Not yet assigned)    Completed MARIA E CESAR     IP consult to case management  Once        Provider:  (Not yet assigned)    Acknowledged FRANSISCA FERGUSON          Significant Diagnostic Studies: Labs:   CMP   Recent Labs   Lab 03/25/23  0737   *   K 5.2*      CO2 14*   GLU 92   *   CREATININE 3.1*   CALCIUM 7.9*   PROT 6.1   ALBUMIN 3.2*   BILITOT 1.2*   ALKPHOS 54*   AST 22   ALT 15   ANIONGAP 12    and CBC   Recent Labs   Lab 03/25/23  0737   WBC 41.89*   HGB 7.7*   HCT 27.5*          Pending Diagnostic Studies:     Procedure Component Value Units Date/Time    Creatinine, urine, random [[id number]] Collected:  03/23/23 1556    Order Status: Sent Lab Status: In process Updated: 03/23/23 1557    Specimen: Urine, Clean Catch     G6PD,Quantitative [[id number]] Collected: 03/24/23 0110    Order Status: Sent Lab Status: In process Updated: 03/24/23 0116    Specimen: Blood     Narrative:      Collection has been rescheduled by HERIBERTO at 03/23/2023 23:30 Reason:   Draw all labs at 1am per rn felix     Osmolality, urine [[id number]] Collected: 03/23/23 1556    Order Status: Sent Lab Status: In process Updated: 03/23/23 1557    Specimen: Urine, Clean Catch     Sodium, urine, random [[id number]] Collected: 03/23/23 1556    Order Status: Sent Lab Status: In process Updated: 03/23/23 1557    Specimen: Urine, Clean Catch     Sodium, urine, random [[id number]] Collected: 03/23/23 1556    Order Status: Sent Lab Status: In process Updated: 03/23/23 1557    Specimen: Urine, Clean Catch     Urinalysis, Reflex to Urine Culture Urine, Clean Catch [[id number]] Collected: 03/24/23 1130    Order Status: Sent Lab Status: In process Updated: 03/24/23 1257    Specimen: Urine     Urinalysis, Reflex to Urine Culture Urine, Clean Catch [[id number]] Collected: 03/23/23 1556    Order Status: Sent Lab Status: In process Updated: 03/23/23 1557    Specimen: Urine         Final Active Diagnoses:    Diagnosis Date Noted POA    PRINCIPAL PROBLEM:  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] 03/23/2023 Yes    Electrolyte disturbance [E87.8] 03/24/2023 Yes    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] 03/24/2023 Yes    Anemia [D64.9] 03/23/2023 Yes    Paroxysmal atrial fibrillation [I48.0] 03/23/2023 Yes    Shortness of breath [R06.02] 03/23/2023 Yes    HTN (hypertension) [I10] 03/23/2023 Yes    LOKI (acute kidney injury) [N17.9] 03/23/2023 Yes    Anxiety [F41.9] 03/23/2023 Yes    Physical debility [R53.81] 03/23/2023 Yes      Problems Resolved During this Admission:      Discharged Condition: stable    Disposition: Hospice/Home    Follow Up:    Patient Instructions:      Activity as tolerated      Medications:  Reconciled Home Medications:      Medication List      START taking these medications    allopurinoL 300 MG tablet  Commonly known as: ZYLOPRIM  Take 1 tablet (300 mg total) by mouth 2 (two) times daily.     hydroxyurea 500 mg Cap  Commonly known as: HYDREA  Take 2 capsules (1,000 mg total) by mouth 2 (two) times daily.     sevelamer carbonate 800 mg Tab  Commonly known as: RENVELA  Take 2 tablets (1,600 mg total) by mouth 3 (three) times daily with meals.     sodium bicarbonate 650 MG tablet  Take 1 tablet (650 mg total) by mouth 3 (three) times daily.        CONTINUE taking these medications    ALPRAZolam 0.25 MG tablet  Commonly known as: XANAX     enalapril 20 MG tablet  Commonly known as: VASOTEC  Take 20 mg by mouth once daily.     furosemide 80 MG tablet  Commonly known as: LASIX        STOP taking these medications    amLODIPine 10 MG tablet  Commonly known as: NORVASC     aspirin 81 MG EC tablet  Commonly known as: ECOTRIN     montelukast 10 mg tablet  Commonly known as: SINGULAIR     terazosin 2 MG capsule  Commonly known as: HYTRIN     warfarin 4 MG tablet  Commonly known as: COUMADIN            Daily Montez, DO  Bone Marrow Transplant  [facility]

## 2023-03-26 NOTE — PROGRESS NOTES
JOSEPH received call from Madhu at [facility], [phone]. Equipment has been delivered and they are ready to accept patient. JOSEPH confirmed with Dr. Montez that patient is ready for dc. JOSEPH set up ambulance pickup for 1:25p.    JOSEPH request Celestina Lopez RN call Madhu once patient leaves. Celestina explained the patient threw up while eating and she's concerned about aspiration and would like a swallow study, Dr. Montez explained the patient will be going home with comfort care and a study is not needed. Celestina will call Madhu when patient leaves the hospital.    JOSEPH received call from Madhu, patient will be going [place] [address], [facility], [address]. JOSEPH notified [facility] who changed the address and stated that an ambulance to the hospital is en route.

## 2023-03-26 NOTE — PLAN OF CARE
Plan of care reviewed with pt:  -AAOx4, weaned from 3.5 to 2L  of O2 via NC, SpO2 at 92-94%, no SOB while resting. However, having SOB while turning in bed  -VS stable with soft BP. NS at 150  -Aspiration precaution. HOB elevated to 90 as pt swallowing. Pt coughed after swallowing a sip of water, also said that he has trouble swallowing pills. So pills were crushed and gave with pudding. Water thicken up and let pt drink small sips at a time, no more coughing with swallowing thick water.  notified, Speech consult  -Cluster care to give pt more resting time. Turned a few time with wedge during shift per pt request  -Total bath given.   -Small skin tear on left upper arm and on buttock with woundcare done per order, all pressure point covered with foam dressing.   -Call light in reach, [facility]

## 2023-03-26 NOTE — NURSING
Patient was discharged home with hospice. He was picked up by ambulance. The patient left on 2 L of oxygen with some SOB with activity. No pertinent distress noted. He's awake, alert and oriented. His IV was discontinued prior to discharge. The zimmerman catheter was emptied prior to transportation. This RN attempted to call Madhu with [facility] but he didn't not answer.

## 2023-03-27 LAB — G6PD RBC-CCNT: 18.8 U/G HB (ref 8–11.9)

## 2023-06-26 PROBLEM — N17.9 AKI (ACUTE KIDNEY INJURY): Status: RESOLVED | Noted: 2023-03-23 | Resolved: 2023-06-26

## 2023-07-15 NOTE — PROGRESS NOTES
[facility]  Hematology  Bone Marrow Transplant  Progress Note    Patient Name: Dm Marinelli  Admission Date: 3/23/2023  Hospital Length of Stay: 1 days  Code Status: DNR    Subjective:     Interval History: hypotensive this am, bolus, IVF given. Remains on O2 at 2 L NC. Has new wet cough and stable sob. TLS noted per labs, Nephrology consulted, K shifted, rasburicase given, phos binder started and Na bicarb. Patient made DNR with plan for home hospice given age, co-morbities and aggressive myeloid neoplasm.     Objective:     Vital Signs (Most Recent):  Temp: 97.5 °F (36.4 °C) (03/24/23 1128)  Pulse: 68 (03/24/23 1429)  Resp: 20 (03/24/23 1128)  BP: (Abnormal) 95/54 (03/24/23 1128)  SpO2: (Abnormal) 94 % (03/24/23 1128)   Vital Signs (24h Range):  Temp:  [96.9 °F (36.1 °C)-98.1 °F (36.7 °C)] 97.5 °F (36.4 °C)  Pulse:  [62-87] 68  Resp:  [16-20] 20  SpO2:  [86 %-96 %] 94 %  BP: ()/(42-54) 95/54     Weight: 86.2 kg (190 lb)  Body mass index is 26.5 kg/m².  Body surface area is 2.08 meters squared.    ECOG SCORE             Intake/Output - Last 3 Shifts       Intake/Output Category 03/22 0700 to 03/23 0659 03/23 0700 to 03/24 0659 03/24 0700 to 03/25 0659    I.V. (mL/kg)  547.6 (6.4)     IV Piggyback  532.8     Total Intake(mL/kg)  1080.4 (12.5)     Net  +1080.4            Urine Occurrence  150 x     Stool Occurrence  0 x 0 x            Physical Exam  Vitals reviewed.   Constitutional:       General: He is not in acute distress.     Appearance: He is well-developed. He is obese. He is ill-appearing.   HENT:      Head: Normocephalic.      Mouth/Throat:      Pharynx: No oropharyngeal exudate.   Eyes:      General: No scleral icterus.     Conjunctiva/sclera: Conjunctivae normal.      Pupils: Pupils are equal, round, and reactive to light.   Neck:      Thyroid: No thyromegaly.   Cardiovascular:      Rate and Rhythm: Normal rate. Rhythm irregular.      Pulses: Normal pulses.      Heart sounds: Normal heart  sounds.   Pulmonary:      Effort: Respiratory distress present.      Breath sounds: Examination of the right-middle field reveals decreased breath sounds. Examination of the left-middle field reveals decreased breath sounds. Examination of the right-lower field reveals decreased breath sounds. Examination of the left-lower field reveals decreased breath sounds. Decreased breath sounds and rales present.      Comments: O2 at 2L NC  Abdominal:      General: Bowel sounds are normal. There is distension.      Palpations: Abdomen is soft.      Tenderness: There is no abdominal tenderness.   Genitourinary:     Comments: Meng in place with dark yellow urine   Musculoskeletal:         General: No tenderness. Normal range of motion.      Cervical back: Normal range of motion and neck supple. Normal range of motion.      Right lower leg: Edema (+1) present.      Left lower leg: Edema (+1) present.   Skin:     General: Skin is warm and dry.      Coloration: Skin is not pale.      Findings: No petechiae or rash.   Neurological:      General: No focal deficit present.      Mental Status: He is alert and oriented to person, place, and time.      Cranial Nerves: No cranial nerve deficit.      Coordination: Coordination normal.   Psychiatric:         Mood and Affect: Mood normal.         Behavior: Behavior normal.         Thought Content: Thought content normal.       Significant Labs:   CBC:   Recent Labs   Lab 03/23/23  1800 03/24/23  0450   WBC 41.53* 39.09*   HGB 7.0* 6.6*   HCT 22.9* 23.4*    388   , CMP:   Recent Labs   Lab 03/23/23  1800 03/24/23  0110 03/24/23  0450 03/24/23  0815   * 135* 133*  133* 133*   K 5.7* 5.8* 5.2*  5.2* 5.3*    106 107  107 105   CO2 18* 16* 13*  13* 14*   * 95 133*  133* 96   * 153* 139*  139* 151*   CREATININE 4.0* 4.0* 3.9*  3.9* 3.6*   CALCIUM 7.9* 7.9* 7.7*  7.7* 7.7*   PROT 6.3  --  6.1  --    ALBUMIN 3.2*  --  3.2*  --    BILITOT 0.9  --  0.6  --     ALKPHOS 59  --  54*  --    AST 21  --  21  --    ALT 15  --  15  --    ANIONGAP 10 13 13  13 14   , Coagulation:   Recent Labs   Lab 03/23/23  1800 03/24/23  0450   INR 2.8* 2.4*   APTT 44.1* 44.7*   , LDH: No results for input(s): LDHCSF, BFSOURCE in the last 48 hours., and Uric Acid   Recent Labs   Lab 03/24/23  0109 03/24/23  0450 03/24/23  0815   URICACID 19.9* 16.8* 14.6*       Diagnostic Results:  I have reviewed all pertinent imaging results/findings within the past 24 hours.    Assessment/Plan:     * Acute leukemia  - suspected acute leukemia  - WBC 48k at OSH  - afebrile  - BC done at previous hospital, UA negative and chest X-ray with no evidence of infection  - received Rocephin x 1 at OSH ED  - smear at OSH shows no blasts, leukoerythroblastosis with circulating nucleated RBC's noted. 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     Anemia  - hgb 6.7 at OSH  - transfused 1 unit PRBC prior to transfer  - CBC this am with hgb 6.6  - B12, folate, ferritin wnl  - denies bleeding  - likely due to myeloid malignancy     LOKI (acute kidney injury)  - creatinine 4.7 at OSH, prior renal function wnl per records  - urine studies ordered  - renal ultrasound with medical renal disease  - uric acid, LDH and phos and K elevated, c/w TLS  - avoid nephrotoxic medication  - nephrology consulted, no current need for dialysis   - na bicarb started, phos binder started, K shifted with D10 and insulin, rasburicase given.     Paroxysmal atrial fibrillation  - history of   - on  coumadin (held for possible BM BX)  - rate controlled afib on EKG  - cardiac monitoring    Shortness of breath  - infections vs heart failure vs leukostasis  - CXR shows increased perihilar and prominent interstitial opacities which could represent a component of pulmonary vascular congestion.  Left lung base opacity cannot be entirely excluded. PA and lateral recommended which showed same with left pleural effusion and ordered  - EKG rate controlled afib, hx of afib  -  at OSH, repeat here >1000  - 2 D echo with ef 55% and severe bilateral atrial enlargement   - continue home lasix      HTN (hypertension)  - enalapril, lasix, norvasc and hytrin on home medications list  - patient reports he no longer takes norvasc or hytrin  - enalapril and lasix continued   - hypotensive this am, enalapril stopped and bolus given     Physical debility  - new weakness, unable to stand per patient  - PT/OT eval  - hospice    Anxiety  - continue home PRN Xanax    Tumor lysis syndrome  - see LOKI, electrolyte disturbance and acute leukemia    Electrolyte disturbance  - due to TLS  - phos binder started, K shifted with insulin and D10  - improvement in electrolytes noted  - Nephrology following        VTE Risk Mitigation (From admission, onward)         Ordered     Reason for No Pharmacological VTE Prophylaxis  Once        Question:  Reasons:  Answer:  Thrombocytopenia    03/23/23 1154     IP VTE HIGH RISK PATIENT  Once         03/23/23 1154     Place sequential compression device  Until discontinued         03/23/23 1154                Disposition: home hospice    Meredith Amin NP  Bone Marrow Transplant  [facility]       show